# Patient Record
Sex: FEMALE | Race: WHITE | Employment: UNEMPLOYED | URBAN - METROPOLITAN AREA
[De-identification: names, ages, dates, MRNs, and addresses within clinical notes are randomized per-mention and may not be internally consistent; named-entity substitution may affect disease eponyms.]

---

## 2017-03-02 PROCEDURE — 75810000283 HC INJECTION NERVE BLOCK

## 2017-03-02 PROCEDURE — 99282 EMERGENCY DEPT VISIT SF MDM: CPT

## 2017-03-03 ENCOUNTER — HOSPITAL ENCOUNTER (EMERGENCY)
Age: 27
Discharge: HOME | End: 2017-03-03
Attending: EMERGENCY MEDICINE
Payer: COMMERCIAL

## 2017-03-03 VITALS
WEIGHT: 168.65 LBS | RESPIRATION RATE: 16 BRPM | SYSTOLIC BLOOD PRESSURE: 133 MMHG | TEMPERATURE: 98.5 F | BODY MASS INDEX: 29.88 KG/M2 | HEIGHT: 63 IN | HEART RATE: 73 BPM | OXYGEN SATURATION: 100 % | DIASTOLIC BLOOD PRESSURE: 78 MMHG

## 2017-03-03 DIAGNOSIS — K02.9 PAIN DUE TO DENTAL CARIES: Primary | ICD-10-CM

## 2017-03-03 DIAGNOSIS — Z72.0 TOBACCO ABUSE: ICD-10-CM

## 2017-03-03 RX ORDER — BUPIVACAINE HYDROCHLORIDE 5 MG/ML
5 INJECTION, SOLUTION EPIDURAL; INTRACAUDAL
Status: DISCONTINUED | OUTPATIENT
Start: 2017-03-03 | End: 2017-03-03 | Stop reason: HOSPADM

## 2017-03-03 RX ORDER — OXYCODONE AND ACETAMINOPHEN 5; 325 MG/1; MG/1
1 TABLET ORAL
Qty: 12 TAB | Refills: 0 | Status: SHIPPED | OUTPATIENT
Start: 2017-03-03 | End: 2017-06-06

## 2017-03-03 RX ORDER — CLINDAMYCIN HYDROCHLORIDE 150 MG/1
300 CAPSULE ORAL 3 TIMES DAILY
Qty: 42 CAP | Refills: 0 | Status: SHIPPED | OUTPATIENT
Start: 2017-03-03 | End: 2017-03-10

## 2017-03-03 RX ORDER — LIDOCAINE HYDROCHLORIDE AND EPINEPHRINE 10; 10 MG/ML; UG/ML
1.5 INJECTION, SOLUTION INFILTRATION; PERINEURAL ONCE
Status: DISCONTINUED | OUTPATIENT
Start: 2017-03-03 | End: 2017-03-03 | Stop reason: HOSPADM

## 2017-03-03 NOTE — ED NOTES
HOLLAND Ding at the bedside to preform nerve block. Discharge instructions reviewed with pt and given by HOLLAND Ding. Pt ambulated out of ED with steady gait accompanied by adult female visitor, after declining wheelchair ride. No other complaints voiced at the time of discharge.

## 2017-03-03 NOTE — ED PROVIDER NOTES
HPI Comments: Curt Arana is a 23 y.o. female presenting ambulatory to ED [facility] ED with c/o sudden onset of gradually worsening right upper dental pain. Per pt, she has been undergoing treatment with Penicillin and Percocet for a dental abscess. Pt notes the medications initially assisted with her pain but since she ran out, her discomfort has returned alongside a small abscess near the fractured right upper molar. Pt rates her current pain as a 10/10 on a pain scale with an associated aching, throbbing sensation to the region. Pt expresses concern to obtain alleviation of her discomfort. Pt specifically denies any nausea, vomiting, fevers, chills, headache, earache, neck pain, or facial swelling. PCP: None    PSHx: appendectomy, cholecystectomy   Social Hx: + EtOH; + Smoker; - Illicit Drugs    There are no other changes, complaints or physical findings at this time. The history is provided by the patient. Past Medical History:   Diagnosis Date    Abnormal Pap smear     chlamydia at beginning of preg; treated at that time    Chronic kidney disease     She thinks she has CKD or \"small kidneys\"     HX OTHER MEDICAL      x2    Pyelonephritis 2015    Rhesus isoimmunization unspecified as to episode of care in pregnancy     o negative     Past Surgical History:   Procedure Laterality Date    HX APPENDECTOMY      HX CHOLECYSTECTOMY       Family History:   Problem Relation Age of Onset    Hypertension Mother     Stroke Mother     Diabetes Father     Stroke Father      Social History     Social History    Marital status: LEGALLY      Spouse name: N/A    Number of children: N/A    Years of education: N/A     Occupational History    Not on file.      Social History Main Topics    Smoking status: Current Every Day Smoker     Packs/day: 0.25     Years: 2.00    Smokeless tobacco: Not on file      Comment: nicotine patch, education on smoking and dental problems    Alcohol use Yes Comment: occ    Drug use: No    Sexual activity: Yes     Partners: Male     Birth control/ protection: Inserts, Pill     Other Topics Concern    Not on file     Social History Narrative     ALLERGIES: Morphine; Nsaids (non-steroidal anti-inflammatory drug); and Tramadol    Review of Systems   Constitutional: Negative for fatigue and fever. HENT: Positive for dental problem. Negative for ear pain, facial swelling and sore throat. Eyes: Negative for pain, redness and visual disturbance. Respiratory: Negative for cough and shortness of breath. Cardiovascular: Negative for chest pain and palpitations. Gastrointestinal: Negative for abdominal pain, nausea and vomiting. Genitourinary: Negative for dysuria, frequency and urgency. Musculoskeletal: Negative for back pain, gait problem, neck pain and neck stiffness. Skin: Negative for rash and wound. Neurological: Negative for dizziness, weakness, light-headedness, numbness and headaches. Vitals:    03/03/17 0000   BP: 133/78   Pulse: 73   Resp: 16   Temp: 98.5 °F (36.9 °C)   SpO2: 100%   Weight: 76.5 kg (168 lb 10.4 oz)   Height: 5' 3\" (1.6 m)          Physical Exam   Constitutional: She is oriented to person, place, and time. She appears well-developed and well-nourished. Non-toxic appearance. No distress. HENT:   Head: Normocephalic and atraumatic. Right Ear: External ear normal.   Left Ear: External ear normal.   Nose: Nose normal.   Mouth/Throat: Uvula is midline. No trismus in the jaw. No facial swelling  No trismus  Decayed remnants of right upper molars  Small gingival abscess above decayed right [id number] molar   Eyes: Conjunctivae and EOM are normal. Pupils are equal, round, and reactive to light. No scleral icterus. Neck: Normal range of motion and full passive range of motion without pain. Cardiovascular: Normal rate and regular rhythm. Pulmonary/Chest: Effort normal. No accessory muscle usage. No tachypnea.  No respiratory distress. She has no decreased breath sounds. She has no wheezes. Abdominal: Soft. There is no tenderness. Musculoskeletal: Normal range of motion. Neurological: She is alert and oriented to person, place, and time. She is not disoriented. No cranial nerve deficit. GCS eye subscore is 4. GCS verbal subscore is 5. GCS motor subscore is 6. Skin: Skin is intact. No rash noted. Psychiatric: She has a normal mood and affect. Her speech is normal.   Nursing note and vitals reviewed. MDM  Number of Diagnoses or Management Options  Pain due to dental caries:   Tobacco abuse:   Diagnosis management comments: DDx: dentalgia, dental caries, dental abscess, dental fracture, gingivitis, tobacco abuse       Amount and/or Complexity of Data Reviewed  Review and summarize past medical records: yes    Patient Progress  Patient progress: stable    ED Course     Procedures    TOBACCO COUNSELING:  Upon evaluation, pt expressed that they are a current tobacco user. Pt has been counseled on the dangers of smoking and was encouraged to quit as soon as possible in order to decrease further risks to their health. Pt has conveyed their understanding of the risks involved should they continue to use tobacco products. Procedure Note - Dental Block:    1:28 AM  Performed by Giancarlo Matute. A posterior superior alveolar nerve block was performed on the right side. 2 mL of 50/50 mix of 1% Lidocaine with Epinephrine and 0.5% Marcaine was injected. Applied gentle pressure at the gumline near the abscess with scant purulent drainage. Total time at bedside, performing this procedure was 1-15 minutes. The procedure was tolerated well without any complications. Written by Daisy Moralez ED Scribe, as dictated by Giancarlo Matute.     MEDICATIONS GIVEN:  Medications   lidocaine-EPINEPHrine (XYLOCAINE) 1 %-1:100,000 injection 15 mg (not administered)   bupivacaine (PF) (MARCAINE) 0.5 % (5 mg/mL) injection 25 mg (not administered) IMPRESSION:  1. Pain due to dental caries    2. Tobacco abuse      PLAN:  1. Discharge Medication List as of 3/3/2017  1:16 AM      START taking these medications    Details   clindamycin (CLEOCIN) 150 mg capsule Take 2 Caps by mouth three (3) times daily for 7 days. , Print, Disp-42 Cap, R-0      oxyCODONE-acetaminophen (PERCOCET) 5-325 mg per tablet Take 1 Tab by mouth every four (4) hours as needed for Pain. Max Daily Amount: 6 Tabs., Print, Disp-12 Tab, R-0         CONTINUE these medications which have NOT CHANGED    Details   CRANBERRY FRUIT EXTRACT (CRANBERRY PO) Take  by mouth., Historical Med           2. Follow-up Information     Follow up With Details Comments Contact Info    [facility] Schedule an appointment as soon as possible for a visit DENTAL SERVICES: call to schedule follow up [address]  [phone]        Return to ED if worse     DISCHARGE NOTE:    1:32 AM  The patient is ready for discharge. The patient signs, symptoms, diagnosis, and discharge instructions have been discussed and the patient has conveyed their understanding. The patient is to follow-up as reccommended or returned to the ER should their symptoms worsen. Plan has been discussed and patient is in agreement. This note is prepared by Gurpreet Clifford acting as Scribe for Tomas Triplett Guess: This scribe's documentation has been prepared under my direction and personally reviewed by me in its entirety. I confirm that the note above accurately reflects all work, treatment procedures and medical decision makings performed by me.

## 2017-03-03 NOTE — LETTER
[address] 
[facility] EMERGENCY DEPT 
[address] [id number] 
[phone] Work/School Note Date: 3/2/2017 To Whom It May concern: 
 
Bárbara Beaver was seen and treated today in the emergency room by the following provider(s): 
Attending Provider: Nayeli Churchill DO Physician Assistant: MICHELA Velásquez. Bárbara Beaver may return to work on [id number]. Sincerely, MICHELA Velásquez

## 2017-03-03 NOTE — ED NOTES
Pt received in exam room, resting in bed comfortably with call bell in reach. Pt reports had broken her tooth about a week ago. Had taken all of her antibiotic and pain medicine, which helped at the time, but is now out. Pain has worsened to a 10/10 and has now developed a \"bubble,\" on the broken tooth. Pt had been waiting until financially ready to visit dentist to have tooth pulled.

## 2017-03-03 NOTE — DISCHARGE INSTRUCTIONS
Emergency [address] by DAVE [facility]  [address], [address]  Open M, W, F: 8AM - 5PM and T, Th: 8AM-6PM  Phone: [phone], press 4  $70 for Emergency Care  $60 for first routine care, then pay by sliding scale based upon income. [facility]  [address] [place], [address]  Phone: [phone]    [employer]  [address] [facility], [address]  Open Monday - Friday 8AM - 4:30 PM  Phone: [address] [place] [address] Dentistry Urgent [address] [place] [address] Dentistry, [address] [place] [address], [place] [address], [address] [place] [address] starting at 8:30 AM M-F  Phone: [phone], press 2  Fee: $150 per tooth (x-ray & extractions only)  Pediatrics Phone[de-identified] [phone], 8-5 M-F    [address] Dentistry, [address] [place] [address], 2nd Floor, [address] [place] [address] starting at 8:30 AM - 3 PM [address] [place] [address] St  [address] [facility], [address] [place] [address]  Phone: [phone] or [phone]  Emergency Hours: 9:30AM - 11AM (extractions)  Simple tooth extraction $ per tooth. #75 for x-ray    [facility] Residents only, over the age of 25  Phone: [phone]. Leave message saying you need an appointment to register.   Hours: Tuesday Evenings

## 2017-06-06 ENCOUNTER — HOSPITAL ENCOUNTER (EMERGENCY)
Age: 27
Discharge: HOME | End: 2017-06-06
Attending: EMERGENCY MEDICINE | Admitting: EMERGENCY MEDICINE
Payer: COMMERCIAL

## 2017-06-06 VITALS
TEMPERATURE: 98.8 F | HEIGHT: 64 IN | RESPIRATION RATE: 18 BRPM | SYSTOLIC BLOOD PRESSURE: 118 MMHG | BODY MASS INDEX: 27.63 KG/M2 | DIASTOLIC BLOOD PRESSURE: 73 MMHG | OXYGEN SATURATION: 100 % | HEART RATE: 98 BPM | WEIGHT: 161.82 LBS

## 2017-06-06 DIAGNOSIS — S02.5XXG OPEN FRACTURE OF TOOTH WITH DELAYED HEALING, SUBSEQUENT ENCOUNTER: ICD-10-CM

## 2017-06-06 DIAGNOSIS — R51.9 FACE PAIN: Primary | ICD-10-CM

## 2017-06-06 PROCEDURE — 74011250637 HC RX REV CODE- 250/637: Performed by: PHYSICIAN ASSISTANT

## 2017-06-06 PROCEDURE — 99283 EMERGENCY DEPT VISIT LOW MDM: CPT

## 2017-06-06 PROCEDURE — 74011000250 HC RX REV CODE- 250: Performed by: PHYSICIAN ASSISTANT

## 2017-06-06 RX ORDER — OXYCODONE AND ACETAMINOPHEN 5; 325 MG/1; MG/1
1 TABLET ORAL
Status: COMPLETED | OUTPATIENT
Start: 2017-06-06 | End: 2017-06-06

## 2017-06-06 RX ORDER — DIPHENHYDRAMINE HCL 12.5MG/5ML
25 ELIXIR ORAL
Status: COMPLETED | OUTPATIENT
Start: 2017-06-06 | End: 2017-06-06

## 2017-06-06 RX ORDER — OXYCODONE AND ACETAMINOPHEN 5; 325 MG/1; MG/1
.5-1 TABLET ORAL
Qty: 12 TAB | Refills: 0 | Status: SHIPPED | OUTPATIENT
Start: 2017-06-06 | End: 2017-08-11

## 2017-06-06 RX ORDER — AMOXICILLIN 500 MG/1
500 TABLET, FILM COATED ORAL 3 TIMES DAILY
Qty: 30 TAB | Refills: 0 | Status: SHIPPED | OUTPATIENT
Start: 2017-06-06 | End: 2017-08-11

## 2017-06-06 RX ORDER — AMOXICILLIN 250 MG/1
500 CAPSULE ORAL
Status: COMPLETED | OUTPATIENT
Start: 2017-06-06 | End: 2017-06-06

## 2017-06-06 RX ORDER — LIDOCAINE HYDROCHLORIDE 20 MG/ML
10 SOLUTION OROPHARYNGEAL
Status: COMPLETED | OUTPATIENT
Start: 2017-06-06 | End: 2017-06-06

## 2017-06-06 RX ADMIN — LIDOCAINE HYDROCHLORIDE 10 ML: 20 SOLUTION ORAL; TOPICAL at 23:38

## 2017-06-06 RX ADMIN — BENZOCAINE 1 SPRAY: 200 SPRAY DENTAL; ORAL; PERIODONTAL at 23:37

## 2017-06-06 RX ADMIN — DIPHENHYDRAMINE HYDROCHLORIDE 25 MG: 12.5 SOLUTION ORAL at 23:38

## 2017-06-06 RX ADMIN — AMOXICILLIN 500 MG: 250 CAPSULE ORAL at 23:49

## 2017-06-06 RX ADMIN — OXYCODONE HYDROCHLORIDE AND ACETAMINOPHEN 1 TABLET: 5; 325 TABLET ORAL at 23:39

## 2017-06-06 NOTE — LETTER
[address] 
[facility] EMERGENCY DEPT 
[address] [place] [address] [address] [id number] [phone] Work/School Note Date: 6/6/2017 To Whom It May concern: 
 
Damaris Pickett was seen and treated today in the emergency room by the following provider(s): 
Attending Provider: Narayan Mayorga MD 
Physician Assistant: MICHELA Quach. Damaris Pickett may return to work on 6/9/17 or sooner, if feeling better. Sincerely, Minnie Calzada PA

## 2017-06-07 NOTE — DISCHARGE INSTRUCTIONS
Emergency [address] by [address] [facility]  [address] [facility] [address] [facility], [address] [facility]  Open M, W, F: 8AM - 5PM and T, Th: 8AM-6PM  Phone: [phone], press 4  $70 for Emergency Care  $60 for first routine care, then pay by sliding scale based upon income. Formerly named [facility] [address] [facility], [address]  Phone: [phone]    [employer]  [address] [facility], [address]  Open Monday - Friday 8AM - 4:30 PM  Phone: [address] [place] [address] Dentistry Urgent [address] [place] [address] Dentistry, [address] [place] [address], [place] [address], [address] [place] [address] starting at 8:30 AM M-F  Phone: [phone], press 2  Fee: $150 per tooth (x-ray & extractions only)  Pediatrics Phone[de-identified] [phone], 8-5 M-F    [address] Dentistry, [address] [facility], 2nd Floor, [address] [place] [address] starting at 8:30 AM - 3 PM [address] [place] [address] St  225 [facility], [address] [facility]  Phone: [phone] or [phone]  Emergency Hours: 9:30AM - 11AM (extractions)  Simple tooth extraction $ per tooth. #75 for x-ray    [facility] Residents only, over the age of 25  Phone: [phone]. Leave message saying you need an appointment to register. Hours: Tuesday Evenings  Broken Tooth: Care Instructions  Your Care Instructions  A tooth can be chipped, broken, or knocked out during sports, an accident, or a bad fall. Your doctor may have fixed your tooth temporarily. You also may have been given pain medicine. If you had signs of infection, you may need to take antibiotics. You will need to see a dentist. If you have chipped a tooth, it may be jagged, which can irritate your mouth and tongue. The dentist may smooth the edges and fill in the part that chipped off.  A permanent tooth that has been knocked out can be put back in (reimplanted) if it is done quickly. The dentist may need to put a crown on a broken tooth to cover the tooth and hold it together. Prompt dental treatment can often prevent infection in the tooth. Follow-up care is a key part of your treatment and safety. Be sure to make and go to all appointments, and call your doctor if you are having problems. It's also a good idea to know your test results and keep a list of the medicines you take. How can you care for yourself at home? · If your tooth pulp is exposed, you can protect it by putting temporary filling material over the broken area. You can buy temporary filling mixes in drugstores. Follow the directions on the label. · To relieve pain and swelling, put ice or a cold cloth on the tooth's gum or cheek area, or suck on a piece of ice. But if the tooth's nerve or pulp is exposed, avoid putting anything too hot or cold near the tooth until you see your dentist.  · Ask your doctor if you can take an over-the-counter pain medicine, such as acetaminophen (Tylenol), ibuprofen (Advil, Motrin), or naproxen (Aleve). Be safe with medicines. Read and follow all instructions on the label. · If your doctor prescribed antibiotics, take them as directed. Do not stop taking them just because you feel better. You need to take the full course of antibiotics. · To help healing, rinse your mouth with warm salt water right after meals. To make a saltwater solution, mix 1 teaspoon of salt in 1 cup of warm water. · Eat soft foods that are easy to chew. · Avoid foods that might sting, such as salty or spicy foods, citrus fruits, and tomatoes. · Do not smoke or use spit tobacco. Tobacco can slow healing in your mouth. If you need help quitting, talk to your doctor about stop-smoking programs and medicines. These can increase your chances of quitting for good. · If your tooth is loose, be gentle when you brush or floss.  But be sure to brush your teeth at least two times a day, and floss at least once a day. When should you call for help? Call your doctor now or seek immediate medical care if:  · You have signs of infection, such as:  ¨ Increased pain or swelling in your mouth. ¨ Red streaks leading from the gum tissue around the tooth. ¨ Pus draining from the area around the tooth. ¨ A fever. · You have pain and swelling after chipping or breaking a tooth. · You have bleeding near a tooth. · You are not able to open or close your mouth normally. Watch closely for changes in your health, and be sure to contact your doctor if:  · Your tooth is sensitive to heat, cold, air, or sweets. · You do not get better as expected. Where can you learn more? Go to http://adam-nelson.info/. Enter W162 in the search box to learn more about \"Broken Tooth: Care Instructions. \"  Current as of: August 9, 2016  Content Version: 11.2  © 2548-2154 [employer]. Care instructions adapted under license by [employer] (which disclaims liability or warranty for this information). If you have questions about a medical condition or this instruction, always ask your healthcare professional. [address] any warranty or liability for your use of this information.

## 2017-06-07 NOTE — ED PROVIDER NOTES
HPI Comments:   Jovany Jeffries is a 33 y.o. female presenting to the ED with her  C/O right upper dental pain which started earlier today. Pt reports she believes one of her teeth may have broken earlier today. Associated symptoms include HA. She is currently waiting to receive her tax return to see a dentist because she cannot otherwise afford it. Patient denies fever or any other symptoms or complaints. There are no other complaints, changes or physical findings at this time. Written by MARKOS Ibrahim, as dictated by Joe Marino      The history is provided by the patient. No  was used. Past Medical History:   Diagnosis Date    Abnormal Pap smear     chlamydia at beginning of preg; treated at that time    Chronic kidney disease     She thinks she has CKD or \"small kidneys\"     HX OTHER MEDICAL      x2    Pyelonephritis 2015    Rhesus isoimmunization unspecified as to episode of care in pregnancy     o negative       Past Surgical History:   Procedure Laterality Date    HX APPENDECTOMY      HX CHOLECYSTECTOMY           Family History:   Problem Relation Age of Onset    Hypertension Mother     Stroke Mother     Diabetes Father     Stroke Father        Social History     Social History    Marital status: LEGALLY      Spouse name: N/A    Number of children: N/A    Years of education: N/A     Occupational History    Not on file.      Social History Main Topics    Smoking status: Current Every Day Smoker     Packs/day: 0.25     Years: 2.00    Smokeless tobacco: Not on file      Comment: nicotine patch, education on smoking and dental problems    Alcohol use Yes      Comment: occ    Drug use: No    Sexual activity: Yes     Partners: Male     Birth control/ protection: Inserts, Pill     Other Topics Concern    Not on file     Social History Narrative         ALLERGIES: Morphine; Nsaids (non-steroidal anti-inflammatory drug); and Tramadol    Review of Systems   Constitutional: Negative. Negative for fever. HENT: Positive for dental problem (right upper dental pain). Eyes: Negative. Respiratory: Negative. Cardiovascular: Negative. Gastrointestinal: Negative. Negative for constipation, diarrhea, nausea and vomiting. Denies liver disease   Endocrine:        Denies thyroid disease   Genitourinary: Negative. Negative for dysuria. Denies kidney disease   Musculoskeletal: Negative. Skin: Negative. Neurological: Positive for headaches. All other systems reviewed and are negative. Vitals:    06/06/17 2152   BP: 118/73   Pulse: 98   Resp: 18   Temp: 98.8 °F (37.1 °C)   SpO2: 100%   Weight: 73.4 kg (161 lb 13.1 oz)   Height: 5' 4\" (1.626 m)            Physical Exam   Constitutional: She is oriented to person, place, and time. She appears well-developed and well-nourished. No distress. HENT:   Head: Normocephalic and atraumatic. Right Ear: External ear normal.   Left Ear: External ear normal.   Nose: Nose normal.   Mouth/Throat: Oropharynx is clear and moist. No oropharyngeal exudate. Tender to right cheek, tooth 4&5 are fractured, no facial swelling   Eyes: Conjunctivae and EOM are normal. Pupils are equal, round, and reactive to light. Right eye exhibits no discharge. Left eye exhibits no discharge. No scleral icterus. Neck: Normal range of motion. Neck supple. No tracheal deviation present. Cardiovascular: Normal rate, regular rhythm, normal heart sounds and intact distal pulses. Exam reveals no gallop and no friction rub. No murmur heard. Pulmonary/Chest: Effort normal and breath sounds normal. No respiratory distress. She has no wheezes. She has no rales. She exhibits no tenderness. Musculoskeletal: She exhibits no edema or tenderness. Lymphadenopathy:     She has no cervical adenopathy. Neurological: She is alert and oriented to person, place, and time. No cranial nerve deficit. Skin: Skin is warm and dry. No rash noted. No erythema. Psychiatric: She has a normal mood and affect. Her behavior is normal.   Nursing note and vitals reviewed. MDM  Number of Diagnoses or Management Options  Diagnosis management comments: DDx: caries, face pain, tooth fx, abscess       Amount and/or Complexity of Data Reviewed  Review and summarize past medical records: yes    Patient Progress  Patient progress: stable      Procedures    MEDICATIONS GIVEN:  Medications   oxyCODONE-acetaminophen (PERCOCET) 5-325 mg per tablet 1 Tab (not administered)   lidocaine (XYLOCAINE) 2 % viscous solution 10 mL (not administered)   diphenhydrAMINE (BENADRYL) 12.5 mg/5 mL oral elixir 25 mg (not administered)   benzocaine (HURRICANE) 20 % spray 1 Spray (not administered)   amoxicillin (AMOXIL) capsule 500 mg (not administered)       IMPRESSION:  1. Face pain    2. Open fracture of tooth with delayed healing, subsequent encounter        PLAN:  1. Current Discharge Medication List      START taking these medications    Details   oxyCODONE-acetaminophen (PERCOCET) 5-325 mg per tablet Take 0.5-1 Tabs by mouth every six (6) hours as needed for Pain (Patient must fill antibiotic in order to fill this.). Max Daily Amount: 4 Tabs. Qty: 12 Tab, Refills: 0      amoxicillin 500 mg tab Take 500 mg by mouth three (3) times daily. Qty: 30 Tab, Refills: 0           2. Follow-up Information     Follow up With Details Comments Contact Info    Local dentist       [facility] EMERGENCY DEPT  If symptoms worsen [address] [place] [address] Drive  [address] [facility]  [phone]        Return to ED if worse     Discharge Note:  11:33 PM  The patient is ready for discharge. The patient's signs, symptoms, diagnosis, and discharge instruction have been discussed and the patient has conveyed their understanding. The patient is to follow up as recommended or return to the ER should their symptoms worsen.  Plan has been discussed and the patient is in agreement. Written by MARKOS Weaver, as dictated by Ramiro Ortega. Attestation: This note is prepared by Jesse Pérez, acting as Scribe for Ramiro Ortega. PA-C Terrance Sandhoff: The scribe's documentation has been prepared under my direction and personally reviewed by me in its entirety. I confirm that the note above accurately reflects all work, treatment, procedures, and medical decision making performed by me.

## 2017-06-07 NOTE — ED NOTES
.Discharge instructions reviewed with patient and given to pt per MICHELA Brannon. Pt able to return/verbalize discharge instructions. Copy of discharge instructions given. RX given to pt. Pt condition stable, no further complaints. Pt out of ER, accompanied by self & family. Ambulatory, steady gait. Wheelchair offered & pt declined. Patient out of ED prior to obtaining discharge vitals. Family to drive patient home. Belongings & discharge paperwork out of ED with patient.

## 2017-06-20 LAB
HBSAG, EXTERNAL: NEGATIVE
HIV, EXTERNAL: NON REACTIVE
RUBELLA, EXTERNAL: NORMAL
TYPE, ABO & RH, EXTERNAL: NORMAL

## 2017-09-20 ENCOUNTER — APPOINTMENT (OUTPATIENT)
Dept: ULTRASOUND IMAGING | Age: 27
End: 2017-09-20
Attending: EMERGENCY MEDICINE
Payer: COMMERCIAL

## 2017-09-20 ENCOUNTER — HOSPITAL ENCOUNTER (EMERGENCY)
Age: 27
Discharge: HOME | End: 2017-09-20
Attending: EMERGENCY MEDICINE
Payer: COMMERCIAL

## 2017-09-20 VITALS
TEMPERATURE: 99.4 F | OXYGEN SATURATION: 99 % | BODY MASS INDEX: 28.98 KG/M2 | SYSTOLIC BLOOD PRESSURE: 109 MMHG | RESPIRATION RATE: 16 BRPM | DIASTOLIC BLOOD PRESSURE: 52 MMHG | WEIGHT: 169.75 LBS | HEIGHT: 64 IN

## 2017-09-20 DIAGNOSIS — O20.0 THREATENED MISCARRIAGE: Primary | ICD-10-CM

## 2017-09-20 DIAGNOSIS — Z67.91 RH NEGATIVE STATUS DURING PREGNANCY, SECOND TRIMESTER: ICD-10-CM

## 2017-09-20 DIAGNOSIS — N30.00 ACUTE CYSTITIS WITHOUT HEMATURIA: ICD-10-CM

## 2017-09-20 DIAGNOSIS — O26.892 RH NEGATIVE STATUS DURING PREGNANCY, SECOND TRIMESTER: ICD-10-CM

## 2017-09-20 LAB
ALBUMIN SERPL-MCNC: 3 G/DL (ref 3.5–5)
ALBUMIN/GLOB SERPL: 0.8 {RATIO} (ref 1.1–2.2)
ALP SERPL-CCNC: 67 U/L (ref 45–117)
ALT SERPL-CCNC: 23 U/L (ref 12–78)
ANION GAP SERPL CALC-SCNC: 7 MMOL/L (ref 5–15)
APPEARANCE UR: CLEAR
AST SERPL-CCNC: 16 U/L (ref 15–37)
BACTERIA URNS QL MICRO: ABNORMAL /HPF
BASOPHILS # BLD: 0 K/UL (ref 0–0.1)
BASOPHILS NFR BLD: 0 % (ref 0–1)
BILIRUB SERPL-MCNC: 0.5 MG/DL (ref 0.2–1)
BILIRUB UR QL: NEGATIVE
BUN SERPL-MCNC: 9 MG/DL (ref 6–20)
BUN/CREAT SERPL: 20 (ref 12–20)
CALCIUM SERPL-MCNC: 8.3 MG/DL (ref 8.5–10.1)
CHLORIDE SERPL-SCNC: 107 MMOL/L (ref 97–108)
CLUE CELLS VAG QL WET PREP: NORMAL
CO2 SERPL-SCNC: 24 MMOL/L (ref 21–32)
COLOR UR: ABNORMAL
CREAT SERPL-MCNC: 0.44 MG/DL (ref 0.55–1.02)
EOSINOPHIL # BLD: 0.2 K/UL (ref 0–0.4)
EOSINOPHIL NFR BLD: 3 % (ref 0–7)
EPITH CASTS URNS QL MICRO: ABNORMAL /LPF
ERYTHROCYTE [DISTWIDTH] IN BLOOD BY AUTOMATED COUNT: 14.1 % (ref 11.5–14.5)
GLOBULIN SER CALC-MCNC: 3.7 G/DL (ref 2–4)
GLUCOSE SERPL-MCNC: 103 MG/DL (ref 65–100)
GLUCOSE UR STRIP.AUTO-MCNC: NEGATIVE MG/DL
HCG UR QL: POSITIVE
HCT VFR BLD AUTO: 32.1 % (ref 35–47)
HGB BLD-MCNC: 11.1 G/DL (ref 11.5–16)
HGB UR QL STRIP: NEGATIVE
HYALINE CASTS URNS QL MICRO: ABNORMAL /LPF (ref 0–5)
KETONES UR QL STRIP.AUTO: NEGATIVE MG/DL
KOH PREP SPEC: NORMAL
LEUKOCYTE ESTERASE UR QL STRIP.AUTO: NEGATIVE
LYMPHOCYTES # BLD: 2.3 K/UL (ref 0.8–3.5)
LYMPHOCYTES NFR BLD: 27 % (ref 12–49)
MCH RBC QN AUTO: 30.2 PG (ref 26–34)
MCHC RBC AUTO-ENTMCNC: 34.6 G/DL (ref 30–36.5)
MCV RBC AUTO: 87.2 FL (ref 80–99)
MONOCYTES # BLD: 0.5 K/UL (ref 0–1)
MONOCYTES NFR BLD: 6 % (ref 5–13)
NEUTS SEG # BLD: 5.4 K/UL (ref 1.8–8)
NEUTS SEG NFR BLD: 64 % (ref 32–75)
NITRITE UR QL STRIP.AUTO: NEGATIVE
PH UR STRIP: 6 [PH] (ref 5–8)
PLATELET # BLD AUTO: 200 K/UL (ref 150–400)
POTASSIUM SERPL-SCNC: 3.6 MMOL/L (ref 3.5–5.1)
PROT SERPL-MCNC: 6.7 G/DL (ref 6.4–8.2)
PROT UR STRIP-MCNC: NEGATIVE MG/DL
RBC # BLD AUTO: 3.68 M/UL (ref 3.8–5.2)
RBC #/AREA URNS HPF: ABNORMAL /HPF (ref 0–5)
SERVICE CMNT-IMP: NORMAL
SODIUM SERPL-SCNC: 138 MMOL/L (ref 136–145)
SP GR UR REFRACTOMETRY: 1.02 (ref 1–1.03)
T VAGINALIS VAG QL WET PREP: NORMAL
UA: UC IF INDICATED,UAUC: ABNORMAL
UROBILINOGEN UR QL STRIP.AUTO: 1 EU/DL (ref 0.2–1)
WBC # BLD AUTO: 8.4 K/UL (ref 3.6–11)
WBC URNS QL MICRO: ABNORMAL /HPF (ref 0–4)

## 2017-09-20 PROCEDURE — 87210 SMEAR WET MOUNT SALINE/INK: CPT | Performed by: EMERGENCY MEDICINE

## 2017-09-20 PROCEDURE — 96372 THER/PROPH/DIAG INJ SC/IM: CPT

## 2017-09-20 PROCEDURE — 85025 COMPLETE CBC W/AUTO DIFF WBC: CPT | Performed by: EMERGENCY MEDICINE

## 2017-09-20 PROCEDURE — 80053 COMPREHEN METABOLIC PANEL: CPT | Performed by: EMERGENCY MEDICINE

## 2017-09-20 PROCEDURE — 87086 URINE CULTURE/COLONY COUNT: CPT | Performed by: EMERGENCY MEDICINE

## 2017-09-20 PROCEDURE — 74011250637 HC RX REV CODE- 250/637: Performed by: EMERGENCY MEDICINE

## 2017-09-20 PROCEDURE — 76815 OB US LIMITED FETUS(S): CPT

## 2017-09-20 PROCEDURE — 81025 URINE PREGNANCY TEST: CPT | Performed by: EMERGENCY MEDICINE

## 2017-09-20 PROCEDURE — 36415 COLL VENOUS BLD VENIPUNCTURE: CPT | Performed by: EMERGENCY MEDICINE

## 2017-09-20 PROCEDURE — 99284 EMERGENCY DEPT VISIT MOD MDM: CPT

## 2017-09-20 PROCEDURE — 87491 CHLMYD TRACH DNA AMP PROBE: CPT | Performed by: EMERGENCY MEDICINE

## 2017-09-20 PROCEDURE — 81001 URINALYSIS AUTO W/SCOPE: CPT | Performed by: EMERGENCY MEDICINE

## 2017-09-20 PROCEDURE — 74011250636 HC RX REV CODE- 250/636: Performed by: EMERGENCY MEDICINE

## 2017-09-20 RX ORDER — OXYCODONE AND ACETAMINOPHEN 5; 325 MG/1; MG/1
2 TABLET ORAL
Status: COMPLETED | OUTPATIENT
Start: 2017-09-20 | End: 2017-09-20

## 2017-09-20 RX ORDER — OXYCODONE AND ACETAMINOPHEN 5; 325 MG/1; MG/1
1 TABLET ORAL
Qty: 20 TAB | Refills: 0 | Status: SHIPPED | OUTPATIENT
Start: 2017-09-20 | End: 2017-10-05

## 2017-09-20 RX ORDER — ONDANSETRON 4 MG/1
4 TABLET, ORALLY DISINTEGRATING ORAL
Qty: 20 TAB | Refills: 0 | Status: SHIPPED | OUTPATIENT
Start: 2017-09-20

## 2017-09-20 RX ORDER — CEPHALEXIN 500 MG/1
500 CAPSULE ORAL 3 TIMES DAILY
Qty: 21 CAP | Refills: 0 | Status: SHIPPED | OUTPATIENT
Start: 2017-09-20 | End: 2017-09-27

## 2017-09-20 RX ADMIN — HUMAN RHO(D) IMMUNE GLOBULIN 0.3 MG: 300 INJECTION, SOLUTION INTRAMUSCULAR at 20:21

## 2017-09-20 RX ADMIN — OXYCODONE HYDROCHLORIDE AND ACETAMINOPHEN 2 TABLET: 5; 325 TABLET ORAL at 20:35

## 2017-09-20 NOTE — ED PROVIDER NOTES
HPI Comments: Monica Patel is 16 week pregnant (/A2)  33 y.o. female with PMHx significant for chronic kidney disease, pyelonephritis, and chlamydia presenting ambulatory to ED [facility] ED with c/o 8/10 aching right flank pain x 2 days. The pt reports additional sx of abdominal cramping and vaginal spotting. She denies nausea and vomiting. OB/GYN: Dr. Marcelo Pearson  PSHx: cholecystectomy, appendectomy   Social History:  (+) Tobacco (0.25 ppd),   (+) EtOH,      (-) Drugs     There are no other complaints, changes, or physical findings at this time. The history is provided by the patient. Past Medical History:   Diagnosis Date    Abnormal Pap smear     chlamydia at beginning of preg; treated at that time    Chronic kidney disease     She thinks she has CKD or \"small kidneys\"     HX OTHER MEDICAL      x2    Pyelonephritis 2015    Rhesus isoimmunization unspecified as to episode of care in pregnancy     o negative       Past Surgical History:   Procedure Laterality Date    HX APPENDECTOMY      HX CHOLECYSTECTOMY           Family History:   Problem Relation Age of Onset    Hypertension Mother     Stroke Mother     Diabetes Father     Stroke Father        Social History     Social History    Marital status: LEGALLY      Spouse name: N/A    Number of children: N/A    Years of education: N/A     Occupational History    Not on file.      Social History Main Topics    Smoking status: Current Every Day Smoker     Packs/day: 0.25     Years: 2.00    Smokeless tobacco: Not on file      Comment: nicotine patch, education on smoking and dental problems    Alcohol use Yes      Comment: occ    Drug use: No    Sexual activity: Yes     Partners: Male     Birth control/ protection: Inserts, Pill     Other Topics Concern    Not on file     Social History Narrative         ALLERGIES: Morphine; Nsaids (non-steroidal anti-inflammatory drug); and Tramadol    Review of Systems   Constitutional: Negative for activity change, chills and fever. HENT: Negative for congestion and sore throat. Eyes: Negative for pain and redness. Respiratory: Negative for cough, chest tightness and shortness of breath. Cardiovascular: Negative for chest pain and palpitations. Gastrointestinal: Positive for abdominal pain. Negative for diarrhea, nausea and vomiting. Genitourinary: Positive for flank pain and vaginal bleeding. Negative for dysuria, frequency and urgency. Musculoskeletal: Negative for back pain and neck pain. Skin: Negative for rash. Neurological: Negative for syncope, light-headedness and headaches. Psychiatric/Behavioral: Negative for confusion. All other systems reviewed and are negative. Vitals:    09/20/17 2037 09/20/17 2045 09/20/17 2100 09/20/17 2115   BP:  116/56 123/66 109/52   Resp:       Temp:       SpO2: 99% 98% 99% 99%   Weight:       Height:                Physical Exam   Constitutional: She is oriented to person, place, and time. She appears well-developed and well-nourished. No distress. HENT:   Head: Normocephalic. Nose: Nose normal.   Mouth/Throat: Oropharynx is clear and moist. No oropharyngeal exudate. Eyes: Conjunctivae are normal. Pupils are equal, round, and reactive to light. No scleral icterus. Neck: Normal range of motion. Neck supple. No JVD present. No tracheal deviation present. No thyromegaly present. Cardiovascular: Normal rate, regular rhythm and intact distal pulses. Exam reveals no gallop and no friction rub. No murmur heard. Pulmonary/Chest: Effort normal and breath sounds normal. No stridor. No respiratory distress. She has no wheezes. She has no rales. Abdominal: Soft. Bowel sounds are normal. She exhibits no distension. There is no tenderness. There is no rebound and no guarding. Musculoskeletal: Normal range of motion. She exhibits no edema. Lymphadenopathy:     She has no cervical adenopathy.    Neurological: She is alert and oriented to person, place, and time. No cranial nerve deficit. She exhibits normal muscle tone. Coordination normal.   Skin: Skin is warm and dry. No rash noted. She is not diaphoretic. No erythema. Psychiatric: She has a normal mood and affect. Her behavior is normal.   Nursing note and vitals reviewed. MDM  Number of Diagnoses or Management Options  Acute cystitis without hematuria:   Rh negative status during pregnancy, second trimester:   Threatened miscarriage:   Diagnosis management comments:   DDx: UTI, threatened miscarriage       Amount and/or Complexity of Data Reviewed  Clinical lab tests: ordered and reviewed  Tests in the radiology section of CPT®: ordered and reviewed  Review and summarize past medical records: yes    Risk of Complications, Morbidity, and/or Mortality  General comments: Pt well appearing; afebrile; vs wnl; ultrasound with 17wk pregnancy with good fetal heart rate; incidental mild right hydro; pt had issues with hydro with prior pregnancy; no increased wbc; ua only with 1+ bacteria; decided to treat due to prior hx; ucx sent; os closed on exam; no cmt; pt O negative, and provided rhogam here; Carolyne Delvalle MD      Patient Progress  Patient progress: stable    ED Course       Procedures    9:45 PM  Bridgette Frederick's final results have been reviewed with her. She has been counseled regarding her diagnosis. She verbally conveys understanding and agreement of the signs, symptoms, diagnosis, treatment and prognosis .      LABORATORY TESTS:  Recent Results (from the past 12 hour(s))   URINALYSIS W/ REFLEX CULTURE    Collection Time: 09/20/17  5:05 PM   Result Value Ref Range    Color YELLOW/STRAW      Appearance CLEAR CLEAR      Specific gravity 1.022 1.003 - 1.030      pH (UA) 6.0 5.0 - 8.0      Protein NEGATIVE  NEG mg/dL    Glucose NEGATIVE  NEG mg/dL    Ketone NEGATIVE  NEG mg/dL    Bilirubin NEGATIVE  NEG      Blood NEGATIVE  NEG      Urobilinogen 1.0 0.2 - 1.0 EU/dL Nitrites NEGATIVE  NEG      Leukocyte Esterase NEGATIVE  NEG      WBC 0-4 0 - 4 /hpf    RBC 0-5 0 - 5 /hpf    Epithelial cells MODERATE (A) FEW /lpf    Bacteria 1+ (A) NEG /hpf    UA:UC IF INDICATED URINE CULTURE ORDERED (A) CNI      Hyaline cast 2-5 0 - 5 /lpf   HCG URINE, QL    Collection Time: 09/20/17  5:05 PM   Result Value Ref Range    HCG urine, Ql. POSITIVE (A) NEG     CBC WITH AUTOMATED DIFF    Collection Time: 09/20/17  5:54 PM   Result Value Ref Range    WBC 8.4 3.6 - 11.0 K/uL    RBC 3.68 (L) 3.80 - 5.20 M/uL    HGB 11.1 (L) 11.5 - 16.0 g/dL    HCT 32.1 (L) 35.0 - 47.0 %    MCV 87.2 80.0 - 99.0 FL    MCH 30.2 26.0 - 34.0 PG    MCHC 34.6 30.0 - 36.5 g/dL    RDW 14.1 11.5 - 14.5 %    PLATELET 782 969 - 755 K/uL    NEUTROPHILS 64 32 - 75 %    LYMPHOCYTES 27 12 - 49 %    MONOCYTES 6 5 - 13 %    EOSINOPHILS 3 0 - 7 %    BASOPHILS 0 0 - 1 %    ABS. NEUTROPHILS 5.4 1.8 - 8.0 K/UL    ABS. LYMPHOCYTES 2.3 0.8 - 3.5 K/UL    ABS. MONOCYTES 0.5 0.0 - 1.0 K/UL    ABS. EOSINOPHILS 0.2 0.0 - 0.4 K/UL    ABS. BASOPHILS 0.0 0.0 - 0.1 K/UL   METABOLIC PANEL, COMPREHENSIVE    Collection Time: 09/20/17  5:54 PM   Result Value Ref Range    Sodium 138 136 - 145 mmol/L    Potassium 3.6 3.5 - 5.1 mmol/L    Chloride 107 97 - 108 mmol/L    CO2 24 21 - 32 mmol/L    Anion gap 7 5 - 15 mmol/L    Glucose 103 (H) 65 - 100 mg/dL    BUN 9 6 - 20 MG/DL    Creatinine 0.44 (L) 0.55 - 1.02 MG/DL    BUN/Creatinine ratio 20 12 - 20      GFR est AA >60 >60 ml/min/1.73m2    GFR est non-AA >60 >60 ml/min/1.73m2    Calcium 8.3 (L) 8.5 - 10.1 MG/DL    Bilirubin, total 0.5 0.2 - 1.0 MG/DL    ALT (SGPT) 23 12 - 78 U/L    AST (SGOT) 16 15 - 37 U/L    Alk.  phosphatase 67 45 - 117 U/L    Protein, total 6.7 6.4 - 8.2 g/dL    Albumin 3.0 (L) 3.5 - 5.0 g/dL    Globulin 3.7 2.0 - 4.0 g/dL    A-G Ratio 0.8 (L) 1.1 - 2.2     RH IMMUNE GLOBULIN PROPHYLACTIC    Collection Time: 09/20/17  6:45 PM   Result Value Ref Range    No. of weeks gestation 16 WEEKS PER JYOTSNA IN      Unit number [id number]     Blood component type RH IMMUNE GLOBULIN     Unit division 00     Status of unit ISSUED    KOH, OTHER SOURCES    Collection Time: 09/20/17  9:11 PM   Result Value Ref Range    Special Requests: NO SPECIAL REQUESTS      KOH NO YEAST SEEN     WET PREP    Collection Time: 09/20/17  9:11 PM   Result Value Ref Range    Clue cells CLUE CELLS ABSENT      Wet prep NO TRICHOMONAS SEEN           IMAGING RESULTS:   PREG UTS LTD   Final Result      EXAM:   PREG UTS LTD   INDICATION: Abdominal pain and vaginal bleeding, 17 weeks pregnant. LMP  5/14/2017. COMPARISON: None.     TECHNIQUE:   Limited real time obstetric ultrasound was performed with multiple static images  obtained.     FINDINGS:  There is a single intrauterine gestation. The estimated gestational age is 17  weeks 4 days +/- 1 week 2 days based on measurement of biparietal diameter, head  circumference, abdominal circumference and femur length. Fetal cardiac activity  is identified with a  fetal heart rate of 129 beats per minute. Fetal anatomic  survey was not performed. The placenta is anterior. The cervix is closed. There  is no placenta previa. Amniotic fluid volume is subjectively normal.  The  ovaries are normal in size and echotexture. The ovaries are not visualized due  to the gravid uterus. Incidental note is made of mild right hydronephrosis.     IMPRESSION  IMPRESSION: Single live 17 week 4 day intrauterine gestation. MEDICATIONS GIVEN:  Medications   Rho D immune globulin (RHOGAM) 1,500 unit (300 mcg) injection 0.3 mg (0.3 mg IntraMUSCular Given 9/20/17 2021)   oxyCODONE-acetaminophen (PERCOCET) 5-325 mg per tablet 2 Tab (2 Tabs Oral Given 9/20/17 2035)       IMPRESSION:  1. Threatened miscarriage    2. Acute cystitis without hematuria    3. Rh negative status during pregnancy, second trimester        PLAN:  1.    Current Discharge Medication List      START taking these medications    Details oxyCODONE-acetaminophen (PERCOCET) 5-325 mg per tablet Take 1 Tab by mouth every four (4) hours as needed for Pain. Max Daily Amount: 6 Tabs. Qty: 20 Tab, Refills: 0      ondansetron (ZOFRAN ODT) 4 mg disintegrating tablet Take 1 Tab by mouth every eight (8) hours as needed for Nausea. Qty: 20 Tab, Refills: 0      cephALEXin (KEFLEX) 500 mg capsule Take 1 Cap by mouth three (3) times daily for 7 days. Qty: 21 Cap, Refills: 0           2. Follow-up Information     Follow up With Details Comments [address] MD Michele Schedule an appointment as soon as possible for a visit in 1 day  [phone]      Carin Mora MD Schedule an appointment as soon as possible for a visit in 1 day  [address]  [facility]  [phone]          Return to ED if worse     DISCHARGE NOTE  9:45 PM  The patient has been re-evaluated and is ready for discharge. Reviewed available results with patient. Counseled pt on diagnosis and care plan. Pt has expressed understanding, and all questions have been answered. Pt agrees with plan and agrees to F/U as recommended, or return to the ED if their sxs worsen. Discharge instructions have been provided and explained to the pt, along with reasons to return to the ED. This note is prepared by Jamil Argueta, acting as Scribe for Ragini Lewis MD.    Ragini Lewis MD: The scribe's documentation has been prepared under my direction and personally reviewed by me in its entirety. I confirm that the note above accurately reflects all work, treatment, procedures, and medical decision making performed by me.

## 2017-09-20 NOTE — LETTER
[address] 
[facility] EMERGENCY DEPT 
[address] [place] [address] [address] [id number] [phone] Work/School Note Date: 9/20/2017 To Whom It May concern: 
 
Cullen Marr was seen and treated today in the emergency room by the following provider(s): 
Attending Provider: Eather Cranker, MD.   
 
Cullen Marr may return to work on 9/22/17. Sincerely, Lukasz Crisostomo RN

## 2017-09-20 NOTE — ED NOTES
Verbal shift change report given to [facility] (oncoming nurse) by Janet DAMONRN(offgoing nurse). Report included the following information SBAR, Kardex and ED Summary. Patient resting comfortably, side rails up, call bell w/in reach, no further needs expressed at this time, aware of POC. Family at the bedside.

## 2017-09-20 NOTE — ED NOTES
Received pt to exam room for c/o flank pain x 2 days along w/ painful urination. Pt reports she is 17 weeks pregnant.

## 2017-09-21 LAB
BLD PROD TYP BPU: NORMAL
BPU ID: NORMAL
GA (WEEKS): NORMAL WK
STATUS OF UNIT,%ST: NORMAL
UNIT DIVISION, %UDIV: 0

## 2017-09-21 NOTE — ED NOTES
Patient discharged and given discharge instructions by [facility] MD PINKY. Patient had an opportunity to ask questions. Patient verbalized understanding of discharge instructions. Patient ambulatory from ED, discharge instructions and prescriptions in hand. Patient accompanied by male .

## 2017-09-21 NOTE — DISCHARGE INSTRUCTIONS
Urinary Tract Infection in Pregnancy: Care Instructions  Your Care Instructions    A urinary tract infection, or UTI, is an infection of the bladder and other urinary structures. Most UTIs occur in the bladder. They often cause pain or burning when you urinate. UTI is the most common bacterial infection in pregnancy. If untreated, a UTI could lead to problems such as a kidney infection or  labor. Most UTIs can be cured with antibiotics. Your doctor will prescribe an antibiotic that is safe during pregnancy. Be sure to finish your medicine so that the infection does not spread to your kidneys. Follow-up care is a key part of your treatment and safety. Be sure to make and go to all appointments, and call your doctor if you are having problems. It's also a good idea to know your test results and keep a list of the medicines you take. How can you care for yourself at home? · Take your antibiotics as directed. Do not stop taking them just because you feel better. You need to take the full course of antibiotics. · Drink extra water and other fluids for the next day or two. This will help wash out the bacteria causing the infection. If you have kidney, heart, or liver disease and have to limit fluids, talk with your doctor before you increase the amount of fluids you drink. · Do not drink alcohol. · Urinate often. Try to empty your bladder each time. Preventing UTIs  · Drink plenty of fluids, enough so that your urine is light yellow or clear like water. This helps you urinate often, which clears bacteria from your system. If you have kidney, heart, or liver disease and have to limit fluids, talk with your doctor before you increase the amount of fluids you drink. · Urinate when you first have the urge. · Urinate right after you have sex. This is the best way for women to avoid UTIs. · When going to the bathroom, wipe from front to back to keep bacteria from entering the vagina or urethra.   When should you call for help? Call your doctor now or seek immediate medical care if:  · Symptoms such as fever, chills, nausea, or vomiting get worse or appear for the first time. · You have new pain in your back just below your rib cage. This is called flank pain. · There is new blood or pus in your urine. · You have any problems with your antibiotic medicine. Watch closely for changes in your health, and be sure to contact your doctor if:  · You are not getting better after 1 day (24 hours). · You have new symptoms, such as blood in your urine. Where can you learn more? Go to http://adam-nelson.info/. Enter M982 in the search box to learn more about \"Urinary Tract Infection in Pregnancy: Care Instructions. \"  Current as of: March 16, 2017  Content Version: 11.3  © 1556-9676 [employer]. Care instructions adapted under license by [employer] (which disclaims liability or warranty for this information). If you have questions about a medical condition or this instruction, always ask your healthcare professional. [place] [address] any warranty or liability for your use of this information. Threatened Miscarriage: Care Instructions  Your Care Instructions    Some women have light spotting or bleeding during the first 12 weeks of pregnancy. In some cases this is normal. Light spotting or bleeding can also be a sign of a possible loss of the pregnancy. This is called a threatened miscarriage. At this point, the doctor may not be able to tell if your vaginal bleeding is normal or is a sign of a miscarriage. In early pregnancy, things such as stress, exercise, and sex do not cause miscarriage. You may be worried or upset about the possibility of losing your pregnancy. But do not blame yourself. There is no treatment to stop a threatened miscarriage. If you do have a miscarriage, there was nothing you could have done to prevent it.  A miscarriage usually means that the pregnancy is not developing normally. The doctor has checked you carefully, but problems can develop later. If you notice any problems or new symptoms, get medical treatment right away. Follow-up care is a key part of your treatment and safety. Be sure to make and go to all appointments, and call your doctor if you are having problems. It's also a good idea to know your test results and keep a list of the medicines you take. How can you care for yourself at home? · If you do have a miscarriage, you will probably have some vaginal bleeding for 1 to 2 weeks. Use pads instead of tampons. · Take acetaminophen (Tylenol) for cramps. Read and follow all instructions on the label. · Do not take two or more pain medicines at the same time unless the doctor told you to. Many pain medicines have acetaminophen, which is Tylenol. Too much acetaminophen (Tylenol) can be harmful. · Do not have sex until your doctor says it is okay. · Get lots of rest over the next several days. · You may do your normal activities if you feel well enough to do them. But do not do any heavy exercise until your doctor says it is okay. · Eat a balanced diet that is high in iron and vitamin C. Foods rich in iron include red meat, shellfish, eggs, beans, and leafy green vegetables. Foods high in vitamin C include citrus fruits, tomatoes, and broccoli. Talk to your doctor about whether you need to take iron pills or a multivitamin. · Do not drink alcohol or use tobacco or illegal drugs. · Do not smoke. If you need help quitting, talk to your doctor about stop-smoking programs and medicines. These can increase your chances of quitting for good. When should you call for help? Call 911 anytime you think you may need emergency care. For example, call if:  · You have sudden, severe pain in your belly or pelvis. · You passed out (lost consciousness). · You have severe vaginal bleeding.   Call your doctor now or seek immediate medical care if:  · You are dizzy or lightheaded, or you feel like you may faint. · You have new or increased pain in your belly or pelvis. · Your vaginal bleeding is getting worse. · You have increased pain in the vaginal area. · You have a fever. · You think you may have passed tissue. Save any tissue that you pass. Take it to your doctor's office as soon as you can. Watch closely for changes in your health, and be sure to contact your doctor if:  · You have new or worse vaginal discharge. · You do not get better as expected. Where can you learn more? Go to http://adam-nelson.info/. Enter K746 in the search box to learn more about \"Threatened Miscarriage: Care Instructions. \"  Current as of: March 16, 2017  Content Version: 11.3  © 4183-6344 [employer]. Care instructions adapted under license by [employer] (which disclaims liability or warranty for this information). If you have questions about a medical condition or this instruction, always ask your healthcare professional. [place] [address] any warranty or liability for your use of this information. Urinary Tract Infection in Women: Care Instructions  Your Care Instructions    A urinary tract infection, or UTI, is a general term for an infection anywhere between the kidneys and the urethra (where urine comes out). Most UTIs are bladder infections. They often cause pain or burning when you urinate. UTIs are caused by bacteria and can be cured with antibiotics. Be sure to complete your treatment so that the infection goes away. Follow-up care is a key part of your treatment and safety. Be sure to make and go to all appointments, and call your doctor if you are having problems. It's also a good idea to know your test results and keep a list of the medicines you take. How can you care for yourself at home? · Take your antibiotics as directed.  Do not stop taking them just because you feel better. You need to take the full course of antibiotics. · Drink extra water and other fluids for the next day or two. This may help wash out the bacteria that are causing the infection. (If you have kidney, heart, or liver disease and have to limit fluids, talk with your doctor before you increase your fluid intake.)  · Avoid drinks that are carbonated or have caffeine. They can irritate the bladder. · Urinate often. Try to empty your bladder each time. · To relieve pain, take a hot bath or lay a heating pad set on low over your lower belly or genital area. Never go to sleep with a heating pad in place. To prevent UTIs  · Drink plenty of water each day. This helps you urinate often, which clears bacteria from your system. (If you have kidney, heart, or liver disease and have to limit fluids, talk with your doctor before you increase your fluid intake.)  · Urinate when you need to. · Urinate right after you have sex. · Change sanitary pads often. · Avoid douches, bubble baths, feminine hygiene sprays, and other feminine hygiene products that have deodorants. · After going to the bathroom, wipe from front to back. When should you call for help? Call your doctor now or seek immediate medical care if:  · Symptoms such as fever, chills, nausea, or vomiting get worse or appear for the first time. · You have new pain in your back just below your rib cage. This is called flank pain. · There is new blood or pus in your urine. · You have any problems with your antibiotic medicine. Watch closely for changes in your health, and be sure to contact your doctor if:  · You are not getting better after taking an antibiotic for 2 days. · Your symptoms go away but then come back. Where can you learn more? Go to http://adam-nelson.info/. Enter H116 in the search box to learn more about \"Urinary Tract Infection in Women: Care Instructions. \"  Current as of: November 28, 2016  Content Version: 11.3  © 5129-1184 [employer]. Care instructions adapted under license by [employer] (which disclaims liability or warranty for this information). If you have questions about a medical condition or this instruction, always ask your healthcare professional. [address] any warranty or liability for your use of this information.

## 2017-09-22 LAB
BACTERIA SPEC CULT: ABNORMAL
C TRACH DNA SPEC QL NAA+PROBE: NEGATIVE
CC UR VC: ABNORMAL
N GONORRHOEA DNA SPEC QL NAA+PROBE: NEGATIVE
SAMPLE TYPE: NORMAL
SERVICE CMNT-IMP: ABNORMAL
SERVICE CMNT-IMP: NORMAL
SPECIMEN SOURCE: NORMAL

## 2017-09-22 PROCEDURE — 99284 EMERGENCY DEPT VISIT MOD MDM: CPT

## 2017-09-23 ENCOUNTER — APPOINTMENT (OUTPATIENT)
Dept: ULTRASOUND IMAGING | Age: 27
End: 2017-09-23
Attending: EMERGENCY MEDICINE
Payer: COMMERCIAL

## 2017-09-23 ENCOUNTER — HOSPITAL ENCOUNTER (EMERGENCY)
Age: 27
Discharge: HOME | End: 2017-09-23
Attending: EMERGENCY MEDICINE
Payer: COMMERCIAL

## 2017-09-23 VITALS
HEART RATE: 79 BPM | SYSTOLIC BLOOD PRESSURE: 121 MMHG | RESPIRATION RATE: 16 BRPM | DIASTOLIC BLOOD PRESSURE: 69 MMHG | TEMPERATURE: 97.9 F | WEIGHT: 171.3 LBS | HEIGHT: 64 IN | BODY MASS INDEX: 29.24 KG/M2 | OXYGEN SATURATION: 100 %

## 2017-09-23 DIAGNOSIS — N13.30 HYDRONEPHROSIS, UNSPECIFIED HYDRONEPHROSIS TYPE: Primary | ICD-10-CM

## 2017-09-23 LAB
APPEARANCE UR: CLEAR
BILIRUB UR QL: NEGATIVE
COLOR UR: NORMAL
CREAT SERPL-MCNC: 0.42 MG/DL (ref 0.55–1.02)
GLUCOSE UR STRIP.AUTO-MCNC: NEGATIVE MG/DL
HGB UR QL STRIP: NEGATIVE
KETONES UR QL STRIP.AUTO: NEGATIVE MG/DL
LEUKOCYTE ESTERASE UR QL STRIP.AUTO: NEGATIVE
NITRITE UR QL STRIP.AUTO: NEGATIVE
PH UR STRIP: 6.5 [PH] (ref 5–8)
PROT UR STRIP-MCNC: NEGATIVE MG/DL
SP GR UR REFRACTOMETRY: 1.01 (ref 1–1.03)
UROBILINOGEN UR QL STRIP.AUTO: 1 EU/DL (ref 0.2–1)

## 2017-09-23 PROCEDURE — 36415 COLL VENOUS BLD VENIPUNCTURE: CPT | Performed by: EMERGENCY MEDICINE

## 2017-09-23 PROCEDURE — 82565 ASSAY OF CREATININE: CPT | Performed by: EMERGENCY MEDICINE

## 2017-09-23 PROCEDURE — 76770 US EXAM ABDO BACK WALL COMP: CPT

## 2017-09-23 PROCEDURE — 81003 URINALYSIS AUTO W/O SCOPE: CPT | Performed by: EMERGENCY MEDICINE

## 2017-09-23 PROCEDURE — 74011250637 HC RX REV CODE- 250/637: Performed by: EMERGENCY MEDICINE

## 2017-09-23 RX ORDER — ACETAMINOPHEN 325 MG/1
650 TABLET ORAL ONCE
Status: COMPLETED | OUTPATIENT
Start: 2017-09-23 | End: 2017-09-23

## 2017-09-23 RX ORDER — NITROFURANTOIN 25; 75 MG/1; MG/1
100 CAPSULE ORAL
Status: COMPLETED | OUTPATIENT
Start: 2017-09-23 | End: 2017-09-23

## 2017-09-23 RX ORDER — NITROFURANTOIN 25; 75 MG/1; MG/1
100 CAPSULE ORAL 2 TIMES DAILY
Qty: 14 CAP | Refills: 0 | Status: SHIPPED | OUTPATIENT
Start: 2017-09-23 | End: 2017-09-30

## 2017-09-23 RX ADMIN — NITROFURANTOIN MONOHYDRATE/MACROCRYSTALLINE 100 MG: 25; 75 CAPSULE ORAL at 01:26

## 2017-09-23 RX ADMIN — ACETAMINOPHEN 650 MG: 325 TABLET ORAL at 01:26

## 2017-09-23 NOTE — DISCHARGE INSTRUCTIONS
Learning About Hydronephrosis  What is hydronephrosis? Hydronephrosis is swelling of the kidneys. It is caused by a buildup of urine. This condition can happen if a tube that drains urine from your kidneys is blocked. The blockage can come from within the urinary tract or from pressure outside of the tract. Pregnancy is an example of an outside (external) cause. This condition is often caused by a blockage such as a kidney stone, tumor, or blood clot. It also can be caused by a problem in your urinary system that you were born with (congenital problem). What are the symptoms? Some of the common symptoms are:  · Pain in one or both sides. · Stomach pain. · Blood in your urine. Some people have no symptoms. How is it diagnosed? Your doctor will do an ultrasound to look for a blockage in your urinary system. An ultrasound allows your doctor to see a picture of the organs and other structures in your belly (abdomen). You also may need blood and urine tests. How is it treated? Your treatment depends on the cause of the swelling. If it is caused by a blockage, your treatment will depend on the type of blockage you have. If the blockage is caused by a kidney stone, you may wait for the stone to pass. If hydronephrosis happens during pregnancy, it usually clears up on its own. You may need to have urine drained from your bladder or kidneys. A urinary catheter is a small, flexible tube that can be inserted through the urethra and into the bladder, allowing urine to drain. A nephrostomy catheter is a thin tube placed into your kidney to drain urine. Sometimes surgery is needed to clear the blockage. If you have a blockage, you should begin to feel better after the blockage is gone. Many people recover and have no long-term problems. But some may have kidney damage. If hydronephrosis was left untreated for a long time, the damage can be severe. Severe damage will require further treatment.   Follow-up care is a key part of your treatment and safety. Be sure to make and go to all appointments, and call your doctor if you are having problems. It's also a good idea to know your test results and keep a list of the medicines you take. Where can you learn more? Go to http://adam-nelson.info/. Enter S386 in the search box to learn more about \"Learning About Hydronephrosis. \"  Current as of: Komal 15, 2016  Content Version: 11.3  © 2236-5753 [employer]. Care instructions adapted under license by "[employer] (which disclaims liability or warranty for this information). If you have questions about a medical condition or this instruction, always ask your healthcare professional. [address] any warranty or liability for your use of this information.

## 2017-09-23 NOTE — ED PROVIDER NOTES
HPI Comments: Malik Mathis is a 33 y.o. female, pmhx significant for chlamydia, CKD, and pyelonephritis, who presents ambulatory with  to the ED c/o persistent aching right flank pain x 3 days. Pt notes associated dysuria x today. Pt was seen 3 days ago for similar symptoms, was diagnosed with a UTI, and rx'd Keflex. Pt states that she's been taking the antibiotics without relief. Pt notes h/o hydronephrosis in the past, and wants to be checked for it now. Pt is currently 17 weeks pregnant, and denies abdominal pain/cramping, vaginal bleeding or discharge. Pt also denies fever, chills, nausea, and vomiting. PCP: None  OB/GYN: Dr. Sherman Odell    PSHx: cholecystectomy, appendectomy   Social History:  (+) Tobacco (0.25 ppd),   (+) EtOH, (-) Drugs   There are no other complaints, changes, or physical findings at this time. The history is provided by the patient. No  was used. Past Medical History:   Diagnosis Date    Abnormal Pap smear     chlamydia at beginning of preg; treated at that time    Chronic kidney disease     She thinks she has CKD or \"small kidneys\"     HX OTHER MEDICAL      x2    Pyelonephritis 2015    Rhesus isoimmunization unspecified as to episode of care in pregnancy     o negative       Past Surgical History:   Procedure Laterality Date    HX APPENDECTOMY      HX CHOLECYSTECTOMY           Family History:   Problem Relation Age of Onset    Hypertension Mother     Stroke Mother     Diabetes Father     Stroke Father        Social History     Social History    Marital status: LEGALLY      Spouse name: N/A    Number of children: N/A    Years of education: N/A     Occupational History    Not on file.      Social History Main Topics    Smoking status: Current Every Day Smoker     Packs/day: 0.25     Years: 2.00    Smokeless tobacco: Not on file      Comment: nicotine patch, education on smoking and dental problems    Alcohol use Yes Comment: occ    Drug use: No    Sexual activity: Yes     Partners: Male     Birth control/ protection: Inserts, Pill     Other Topics Concern    Not on file     Social History Narrative         ALLERGIES: Morphine; Nsaids (non-steroidal anti-inflammatory drug); and Tramadol    Review of Systems   Constitutional: Negative for activity change, appetite change, chills, fever and unexpected weight change. HENT: Negative for congestion. Eyes: Negative for pain and visual disturbance. Respiratory: Negative for cough and shortness of breath. Cardiovascular: Negative for chest pain. Gastrointestinal: Negative for abdominal pain, diarrhea, nausea and vomiting. Genitourinary: Positive for dysuria and flank pain. Negative for vaginal bleeding and vaginal discharge. Musculoskeletal: Negative for back pain. Skin: Negative for rash. Neurological: Negative for headaches. Vitals:    09/23/17 0000   BP: 115/68   Resp: 16   Temp: 97.9 °F (36.6 °C)   SpO2: 100%   Weight: 77.7 kg (171 lb 4.8 oz)   Height: 5' 4\" (1.626 m)            Physical Exam   Constitutional: She is oriented to person, place, and time. She appears well-developed and well-nourished. HENT:   Head: Normocephalic and atraumatic. Mouth/Throat: Oropharynx is clear and moist.   Eyes: Conjunctivae and EOM are normal. Pupils are equal, round, and reactive to light. Right eye exhibits no discharge. Left eye exhibits no discharge. Neck: Normal range of motion. Neck supple. Cardiovascular: Normal rate and normal heart sounds. No murmur heard. Pulmonary/Chest: Effort normal and breath sounds normal. No respiratory distress. She has no wheezes. She has no rales. Abdominal: Soft. Bowel sounds are normal. There is no tenderness. Gravid uterus  No CVA tenderness BL   Musculoskeletal: Normal range of motion. Neurological: She is alert and oriented to person, place, and time. No cranial nerve deficit. She exhibits normal muscle tone. Skin: Skin is warm and dry. No rash noted. She is not diaphoretic. Nursing note and vitals reviewed. MDM  Number of Diagnoses or Management Options  Hydronephrosis, unspecified hydronephrosis type:   Diagnosis management comments: Well appearing opiate dependent female, without fevers or vomiting, presenting with continued flank pain despite oxycodone. Pt appears well without evidence of sepsis. Amount and/or Complexity of Data Reviewed  Tests in the radiology section of CPT®: ordered and reviewed  Review and summarize past medical records: yes    Patient Progress  Patient progress: stable    ED Course       Procedures    Progress Note:  2:30 AM  Pt took the Tylenol, but is requesting more pain medication at this time, as well as cranberry juice. Explained I dont feel it is safe at this time for the fetus given normal HR and exam.   Written by Cassius Bell, ED Scribe, as dictated by Demetrius Rendon MD.     Progress Note:  3:37 AM  Discussed workup results/findings, and counseled pt regarding her diagnosis. Pt has been encouraged to follow-up as instructed. All questions have been answered, and pt conveyed understanding.   Written by Cassius Bell, ED Scribe, as dictated by Demetrius Rendon MD.    LABORATORY TESTS:  Recent Results (from the past 12 hour(s))   CREATININE    Collection Time: 09/23/17  2:14 AM   Result Value Ref Range    Creatinine 0.42 (L) 0.55 - 1.02 MG/DL    GFR est AA >60 >60 ml/min/1.73m2    GFR est non-AA >60 >60 ml/min/1.73m2   URINALYSIS W/ RFLX MICROSCOPIC    Collection Time: 09/23/17  2:14 AM   Result Value Ref Range    Color YELLOW/STRAW      Appearance CLEAR CLEAR      Specific gravity 1.008 1.003 - 1.030      pH (UA) 6.5 5.0 - 8.0      Protein NEGATIVE  NEG mg/dL    Glucose NEGATIVE  NEG mg/dL    Ketone NEGATIVE  NEG mg/dL    Bilirubin NEGATIVE  NEG      Blood NEGATIVE  NEG      Urobilinogen 1.0 0.2 - 1.0 EU/dL    Nitrites NEGATIVE  NEG Leukocyte Esterase NEGATIVE  NEG         IMAGING RESULTS:  US RETROPERITONEUM COMP   Final Result     EXAM:  US RETROPERITONEUM COMP     INDICATION:  uti, flank pain r/o hydro and stone     COMPARISON: None.     TECHNIQUE:  Real-time sonography of the kidneys, retroperitoneum and bladder was performed  with multiple static images obtained.     FINDINGS:  The kidneys have normal echogenicity with no mass or stone. There is moderate  bilateral hydronephrosis. . The right kidney measures 13 cm and the left kidney  measures 13.5 cm in length. There is suggestion of increased echogenicity renal  periods may are present medullary nephrocalcinosis.     The aorta tapers normally. The proximal iliac arteries is obscured by bowel gas   The IVC is normal. No retroperitoneal mass is identified.     The urinary bladder is normal.     IMPRESSION  IMPRESSION: There is moderate bilateral hydronephrosis. MEDICATIONS GIVEN:  Medications   acetaminophen (TYLENOL) tablet 650 mg (650 mg Oral Given 9/23/17 0126)   nitrofurantoin (macrocrystal-monohydrate) (MACROBID) capsule 100 mg (100 mg Oral Given 9/23/17 0126)       IMPRESSION:  1. Hydronephrosis, unspecified hydronephrosis type        PLAN:  1. Discharge home. Current Discharge Medication List      START taking these medications    Details   nitrofurantoin, macrocrystal-monohydrate, (MACROBID) 100 mg capsule Take 1 Cap by mouth two (2) times a day for 7 days. Qty: 14 Cap, Refills: 0           2. Follow-up Information     Follow up With Details Comments Contact Info    [facility] EMERGENCY DEPT  If symptoms worsen with fevers or vomiting. [address] [place] [address]  [phone]        3. Return to ED if worse       DISCHARGE NOTE:  3:37 AM  Patient's results have been reviewed with them.  Patient and/or family have verbally conveyed their understanding and agreement of the patient's signs, symptoms, diagnosis, treatment and prognosis and additionally agree to follow up as recommended or return to the Emergency Room should their condition change prior to follow-up. Discharge instructions have also been provided to the patient with some educational information regarding their diagnosis as well a list of reasons why they would want to return to the ER prior to their follow-up appointment should their condition change. This note is prepared by Maia Dang, acting as Scribe for MD Gely Vega MD: The scribe's documentation has been prepared under my direction and personally reviewed by me in its entirety. I confirm that the note above accurately reflects all work, treatment, procedures, and medical decision making performed by me.

## 2017-10-05 LAB
CHLAMYDIA, EXTERNAL: NEGATIVE
N. GONORRHEA, EXTERNAL: NEGATIVE

## 2018-01-11 LAB
ANTIBODY SCREEN, EXTERNAL: NEGATIVE
T. PALLIDUM, EXTERNAL: NEGATIVE

## 2018-01-30 ENCOUNTER — HOSPITAL ENCOUNTER (INPATIENT)
Age: 28
LOS: 7 days | Discharge: HOME | DRG: 560 | End: 2018-02-07
Attending: EMERGENCY MEDICINE | Admitting: OBSTETRICS & GYNECOLOGY
Payer: COMMERCIAL

## 2018-01-30 DIAGNOSIS — R10.9 ACUTE FLANK PAIN: Primary | ICD-10-CM

## 2018-01-30 DIAGNOSIS — Z3A.36 36 WEEKS GESTATION OF PREGNANCY: ICD-10-CM

## 2018-01-30 DIAGNOSIS — R34 DECREASED URINE OUTPUT: ICD-10-CM

## 2018-01-30 PROBLEM — O23.43 UTI IN PREGNANCY, ANTEPARTUM, THIRD TRIMESTER: Status: ACTIVE | Noted: 2018-01-30

## 2018-01-30 LAB
APPEARANCE UR: ABNORMAL
BACTERIA URNS QL MICRO: ABNORMAL /HPF
BILIRUB UR QL: NEGATIVE
COLOR UR: ABNORMAL
EPITH CASTS URNS QL MICRO: ABNORMAL /LPF
ERYTHROCYTE [DISTWIDTH] IN BLOOD BY AUTOMATED COUNT: 14.1 % (ref 11.5–14.5)
GLUCOSE UR STRIP.AUTO-MCNC: NEGATIVE MG/DL
HCT VFR BLD AUTO: 29.7 % (ref 35–47)
HGB BLD-MCNC: 10 G/DL (ref 11.5–16)
HGB UR QL STRIP: NEGATIVE
KETONES UR QL STRIP.AUTO: NEGATIVE MG/DL
LEUKOCYTE ESTERASE UR QL STRIP.AUTO: ABNORMAL
MCH RBC QN AUTO: 29.9 PG (ref 26–34)
MCHC RBC AUTO-ENTMCNC: 33.7 G/DL (ref 30–36.5)
MCV RBC AUTO: 88.7 FL (ref 80–99)
NITRITE UR QL STRIP.AUTO: POSITIVE
NRBC # BLD: 0 K/UL (ref 0–0.01)
NRBC BLD-RTO: 0 PER 100 WBC
PH UR STRIP: 7 [PH] (ref 5–8)
PLATELET # BLD AUTO: 203 K/UL (ref 150–400)
PMV BLD AUTO: 9.9 FL (ref 8.9–12.9)
PROT UR STRIP-MCNC: NEGATIVE MG/DL
RBC # BLD AUTO: 3.35 M/UL (ref 3.8–5.2)
RBC #/AREA URNS HPF: ABNORMAL /HPF (ref 0–5)
SP GR UR REFRACTOMETRY: 1.02 (ref 1–1.03)
UA: UC IF INDICATED,UAUC: ABNORMAL
UROBILINOGEN UR QL STRIP.AUTO: 1 EU/DL (ref 0.2–1)
WBC # BLD AUTO: 9 K/UL (ref 3.6–11)
WBC URNS QL MICRO: >100 /HPF (ref 0–4)

## 2018-01-30 PROCEDURE — 74011250636 HC RX REV CODE- 250/636: Performed by: OBSTETRICS & GYNECOLOGY

## 2018-01-30 PROCEDURE — 77030034849

## 2018-01-30 PROCEDURE — 4A1H74Z MONITORING OF PRODUCTS OF CONCEPTION, CARDIAC ELECTRICAL ACTIVITY, VIA NATURAL OR ARTIFICIAL OPENING: ICD-10-PCS | Performed by: OBSTETRICS & GYNECOLOGY

## 2018-01-30 PROCEDURE — 81001 URINALYSIS AUTO W/SCOPE: CPT | Performed by: OBSTETRICS & GYNECOLOGY

## 2018-01-30 PROCEDURE — 87077 CULTURE AEROBIC IDENTIFY: CPT | Performed by: OBSTETRICS & GYNECOLOGY

## 2018-01-30 PROCEDURE — 59025 FETAL NON-STRESS TEST: CPT

## 2018-01-30 PROCEDURE — 36415 COLL VENOUS BLD VENIPUNCTURE: CPT | Performed by: OBSTETRICS & GYNECOLOGY

## 2018-01-30 PROCEDURE — 87086 URINE CULTURE/COLONY COUNT: CPT | Performed by: OBSTETRICS & GYNECOLOGY

## 2018-01-30 PROCEDURE — 85027 COMPLETE CBC AUTOMATED: CPT | Performed by: OBSTETRICS & GYNECOLOGY

## 2018-01-30 PROCEDURE — 87186 SC STD MICRODIL/AGAR DIL: CPT | Performed by: OBSTETRICS & GYNECOLOGY

## 2018-01-30 PROCEDURE — 65410000002 HC RM PRIVATE OB

## 2018-01-30 PROCEDURE — 75810000275 HC EMERGENCY DEPT VISIT NO LEVEL OF CARE

## 2018-01-30 PROCEDURE — 74011250637 HC RX REV CODE- 250/637: Performed by: OBSTETRICS & GYNECOLOGY

## 2018-01-30 PROCEDURE — 99218 HC RM OBSERVATION: CPT

## 2018-01-30 RX ORDER — FAMOTIDINE 20 MG/1
20 TABLET, FILM COATED ORAL
Status: DISCONTINUED | OUTPATIENT
Start: 2018-01-30 | End: 2018-02-07 | Stop reason: HOSPADM

## 2018-01-30 RX ORDER — SODIUM CHLORIDE 0.9 % (FLUSH) 0.9 %
5-10 SYRINGE (ML) INJECTION EVERY 8 HOURS
Status: DISCONTINUED | OUTPATIENT
Start: 2018-01-30 | End: 2018-02-07 | Stop reason: HOSPADM

## 2018-01-30 RX ORDER — SODIUM CHLORIDE, SODIUM LACTATE, POTASSIUM CHLORIDE, CALCIUM CHLORIDE 600; 310; 30; 20 MG/100ML; MG/100ML; MG/100ML; MG/100ML
75 INJECTION, SOLUTION INTRAVENOUS CONTINUOUS
Status: DISCONTINUED | OUTPATIENT
Start: 2018-01-30 | End: 2018-02-07 | Stop reason: HOSPADM

## 2018-01-30 RX ORDER — ZOLPIDEM TARTRATE 5 MG/1
5 TABLET ORAL
Status: DISCONTINUED | OUTPATIENT
Start: 2018-01-30 | End: 2018-02-07 | Stop reason: HOSPADM

## 2018-01-30 RX ORDER — SWAB
1 SWAB, NON-MEDICATED MISCELLANEOUS DAILY
Status: DISCONTINUED | OUTPATIENT
Start: 2018-01-31 | End: 2018-02-07 | Stop reason: HOSPADM

## 2018-01-30 RX ORDER — ONDANSETRON 4 MG/1
4 TABLET, ORALLY DISINTEGRATING ORAL
Status: DISCONTINUED | OUTPATIENT
Start: 2018-01-30 | End: 2018-02-07 | Stop reason: HOSPADM

## 2018-01-30 RX ORDER — DOCUSATE SODIUM 100 MG/1
100 CAPSULE, LIQUID FILLED ORAL
Status: DISCONTINUED | OUTPATIENT
Start: 2018-01-30 | End: 2018-02-07 | Stop reason: HOSPADM

## 2018-01-30 RX ORDER — PHENAZOPYRIDINE HYDROCHLORIDE 100 MG/1
200 TABLET, FILM COATED ORAL
Status: DISCONTINUED | OUTPATIENT
Start: 2018-01-30 | End: 2018-02-07 | Stop reason: HOSPADM

## 2018-01-30 RX ORDER — DIPHENHYDRAMINE HCL 25 MG
25 CAPSULE ORAL
Status: DISCONTINUED | OUTPATIENT
Start: 2018-01-30 | End: 2018-02-07 | Stop reason: HOSPADM

## 2018-01-30 RX ORDER — SODIUM CHLORIDE 0.9 % (FLUSH) 0.9 %
5-10 SYRINGE (ML) INJECTION AS NEEDED
Status: DISCONTINUED | OUTPATIENT
Start: 2018-01-30 | End: 2018-02-07 | Stop reason: HOSPADM

## 2018-01-30 RX ORDER — ACETAMINOPHEN 325 MG/1
650 TABLET ORAL
Status: DISCONTINUED | OUTPATIENT
Start: 2018-01-30 | End: 2018-02-01

## 2018-01-30 RX ADMIN — PHENAZOPYRIDINE HYDROCHLORIDE 200 MG: 100 TABLET ORAL at 22:49

## 2018-01-30 RX ADMIN — VANCOMYCIN HYDROCHLORIDE 1000 MG: 1 INJECTION, POWDER, LYOPHILIZED, FOR SOLUTION INTRAVENOUS at 23:20

## 2018-01-30 RX ADMIN — SODIUM CHLORIDE 1000 ML: 900 INJECTION, SOLUTION INTRAVENOUS at 20:00

## 2018-01-30 RX ADMIN — ACETAMINOPHEN 650 MG: 325 TABLET ORAL at 22:49

## 2018-01-30 RX ADMIN — ZOLPIDEM TARTRATE 5 MG: 5 TABLET ORAL at 23:43

## 2018-01-30 RX ADMIN — SODIUM CHLORIDE, SODIUM LACTATE, POTASSIUM CHLORIDE, AND CALCIUM CHLORIDE 75 ML/HR: 600; 310; 30; 20 INJECTION, SOLUTION INTRAVENOUS at 22:37

## 2018-01-30 NOTE — IP AVS SNAPSHOT
Summary of Care Report The Summary of Care report has been created to help improve care coordination. Users with access to [employer] or [facility] (Web-based application) may access additional patient information including the Discharge Summary. If you are not currently a [address] user and need more information, please call the number listed below in the [place] [address] section and ask to be connected with Medical Records. Facility Information Name Address Phone [address] [address] [phone] Patient Information Patient Name Sex BRETT Nieto ([id number]) Female 1990 Discharge Information Admitting Provider Service Area Unit Raul Tapia MD / [phone] [facility] 3 Mother Infant / [phone] Discharge Provider Discharge Date/Time Discharge Disposition Destination (none) 2018 (Pending) AHR (none) Patient Language Language ENGLISH [13] Hospital Problems as of 2018  Reviewed: 2015  7:47 AM by Jonah Nelson MD  
  
  
  
 Class Noted - Resolved Last Modified POA Active Problems Abdominal pressure  2018 - Present 2018 by Jayshree Canela MD Unknown Entered by Jayshree Canela MD  
  Flank pain  2018 - Present 2018 by Raul Tapia MD Unknown Entered by Raul Tapia MD  
  
Non-Hospital Problems as of 2018  Reviewed: 2015  7:47 AM by Jonah Nelson MD  
  
  
  
 Class Noted - Resolved Last Modified Active Problems Pyelonephritis  2015 - Present 2015   Entered by Halley Gilbert MD  
  Pregnancy related back pain in third trimester, antepartum Present on Admission 3/22/2015 - Present 3/22/2015 by Todd Poole MD  
  Entered by Todd Poole MD  
 Pregnancy  4/8/2015 - Present 4/8/2015 by Helder Castro MD  
  Entered by Helder Castro MD  
  
You are allergic to the following Allergen Reactions Morphine Other (comments) Causes muscle aches Nsaids (Non-Steroidal Anti-Inflammatory Drug) Other (comments) Pt reports kidney issues Tramadol Seizures Current Discharge Medication List  
  
START taking these medications Dose & Instructions Dispensing Information Comments HYDROmorphone 4 mg tablet Commonly known as:  DILAUDID Dose:  4 mg Take 1 Tab by mouth every four (4) hours as needed. Max Daily Amount: 24 mg. Quantity:  20 Tab Refills:  0  
   
 ibuprofen 600 mg tablet Commonly known as:  MOTRIN Dose:  600 mg Take 1 Tab by mouth every six (6) hours as needed for Pain for up to 360 days. Quantity:  30 Tab Refills:  0  
   
 nitrofurantoin (macrocrystal-monohydrate) 100 mg capsule Commonly known as:  MACROBID Dose:  100 mg Take 1 Cap by mouth every twelve (12) hours for 4 days. Quantity:  8 Cap Refills:  0 ASK your doctor about these medications Dose & Instructions Dispensing Information Comments  
 cephALEXin 500 mg capsule Commonly known as:  Branda Burgess Dose:  500 mg Take 1 Cap by mouth three (3) times daily. Quantity:  15 Cap Refills:  0  
   
 ondansetron 4 mg disintegrating tablet Commonly known as:  ZOFRAN ODT Dose:  4 mg Take 1 Tab by mouth every eight (8) hours as needed for Nausea. Quantity:  20 Tab Refills:  0 PRENATAL VITAMIN PO Take  by mouth. Refills:  0 Current Immunizations Name Date Rho(D) Immune Globulin - IM 9/20/2017, 8/17/2014 Surgery Information ID Date/Time Status Primary Surgeon All Procedures Location [id number] 2/5/2018 Complete   [facility] - DO NOT SCHEDULE Follow-up Information Follow up With Details Comments Contact Info Provider Unknown   Patient not available to ask Discharge Instructions POST DELIVERY DISCHARGE INSTRUCTIONS Name: Susie Oconnor YOB: 1990 Primary Diagnosis: Active Problems: 
  Abdominal pressure (2018) Flank pain (2018) General:  
 
Diet/Diet Restrictions: 
· Eight 8-ounce glasses of fluid daily (water, juices); avoid excessive caffeine intake. · Meals/snacks as desired which are high in fiber and carbohydrates and low in fat and cholesterol. Medications:  
 
 
 
Physical Activity / Restrictions / Safety: · Avoid heavy lifting, no more that 8 lbs. For 2-3 weeks;  
· Limit use of stairs to 2 times daily for the first week home. · No driving for one week. · Avoid intercourse 4-6 weeks, no douching or tampon use. · Check with obstetrician before starting or resuming an exercise program.   
 
Discharge Instructions/Special Treatment/Home Care Needs:  
 
· Continue prenatal vitamins. · Continue to use squirt bottle with warm water on your episiotomy after each bathroom use until bleeding stops. · If steri-strips applied to your incision, remove in 7-10 days. Call your doctor for the following: · Fever over 101 degrees by mouth. · Vaginal bleeding heavier than a normal menstrual period or clots larger than a golf ball. · Red streaks or increased swelling of legs, painful red streaks on your breast. 
· Painful urination, constipation and increased pain or swelling or discharge with your incision. · If you feel extremely anxious or overwhelmed. · If you have thoughts of harming yourself and/or your baby. Pain Management:  
 
· Take Acetaminophen (Tylenol) or Ibuprofen (Advil, Motrin), as directed for pain. · Use a warm Sitz bath 3 times daily to relieve episiotomy or hemorrhoidal discomfort. · For hemorrhoidal discomfort, use Tucks and Anusol cream as needed and directed. · Heating pad to  incision as needed. Follow-Up Care:  
 
Appointment with MD: Follow-up Appointments Procedures  FOLLOW UP VISIT Appointment in: 3 - 5 Days With Dr Griggs Estimmary ann for urine test of cure and 6 weeks for postpartum check With [facility] in 1-2 weeks With Dr Griggs Estimable for urine test of cure and 6 weeks for postpartum check With [facility] in 1-2 weeks Standing Status:   Standing Number of Occurrences:   1 Order Specific Question:   Appointment in Answer:   3 - 5 Days Telephone number: [phone] Signed By: Kit Melgar MD                                                                                                   Date: 2018 Time: 9:31 AM 
 
MyChart Activation Thank you for requesting access to [employer]. Please follow the instructions below to securely access and download your online medical record. [employer] allows you to send messages to your doctor, view your test results, renew your prescriptions, schedule appointments, and more. How Do I Sign Up? 1. In your internet browser, go to [email or web address] 
2. Click on the First Time User? Click Here link in the Sign In box. You will be redirect to the New Member Sign Up page. 3. Enter your [employer] Access Code exactly as it appears below. You will not need to use this code after youve completed the sign-up process. If you do not sign up before the expiration date, you must request a new code. Sync[employer]ak Access Code: [email or web address] Expires: 2018 12:14 PM (This is the date your [employer] access code will ) 4. Enter the last four digits of your Social Security Number (xxxx) and Date of Birth (mm/dd/yyyy) as indicated and click Submit. You will be taken to the next sign-up page. 5. Create a S[employer]bak ID. This will be your [employer] login ID and cannot be changed, so think of one that is secure and easy to remember. 6. Create a S[employer]ak password. You can change your password at any time. 7. Enter your Password Reset Question and Answer. This can be used at a later time if you forget your password. 8. Enter your e-mail address. You will receive e-mail notification when new information is available in [address] 9. Click Sign Up. You can now view and download portions of your medical record. 10. Click the Download Summary menu link to download a portable copy of your medical information. Additional Information If you have questions, please visit the Frequently Asked Questions section of the [facility] website at [email or web address] Remember, [facility] is NOT to be used for urgent needs. For medical emergencies, dial 911. Chart Review Routing History Recipient Method Report Sent By Debra Carty MD  
[address] IP Auto Routed Notes Bethany Sahni MD [address] 11/26/2014 10:50 PM 11/26/2014 Nicole Carty MD  
[address] IP Auto Routed Notes Bethany Sahni MD [address] 1/5/2015  3:47 PM 01/05/2015 Nicole Carty MD  
[address] IP Auto Routed Notes MD Theresa Ferro 1/17/2015 10:45 AM 01/17/2015 Nicole Carty MD  
[address] IP Auto Routed Notes MD Maral Youngh Mira 3/22/2015  2:23 AM 03/22/2015 Nicole Carty MD  
[address] IP Auto Routed Notes Andrey Layton MD [[id number]] 4/5/2015  6:47 PM 04/05/2015 Nicole Carty MD  
[address] IP Auto Routed Notes Bethany Sahni MD [[id number]] 4/6/2015  8:17 AM 04/06/2015 Nicole Carty MD  
[address] IP Auto Routed Notes Omar Kaur MD [[id number]] 4/11/2015 10:23 AM 04/11/2015 Provider Unknown, MD  
Patient not available to ask [address] IP Auto Routed Notes Marisol Bryan MD [facility] 2/7/2018  9:33 AM 02/07/2018

## 2018-01-30 NOTE — IP AVS SNAPSHOT
[address] [facility] 
[phone] Patient: Eva Aldrich MRN: [id number] CGQ:5/83/[id number] About your hospitalization You were admitted on:  January 30, 2018 You last received care in the:  [facility] You were discharged on:  February 7, 2018 Why you were hospitalized Your primary diagnosis was:  Not on File Your diagnoses also included:  Abdominal Pressure, Uti In Pregnancy, Antepartum, Third Trimester, Flank Pain Follow-up Information Follow up With Details Comments Contact Info Provider Unknown   Patient not available to ask Discharge Orders None A check binu indicates which time of day the medication should be taken. My Medications START taking these medications Instructions Each Dose to Equal  
 Morning Noon Evening Bedtime HYDROmorphone 4 mg tablet Commonly known as:  DILAUDID Your last dose was: Your next dose is: Take 1 Tab by mouth every four (4) hours as needed. Max Daily Amount: 24 mg.  
 4 mg  
    
   
   
   
  
 ibuprofen 600 mg tablet Commonly known as:  MOTRIN Your last dose was: Your next dose is: Take 1 Tab by mouth every six (6) hours as needed for Pain for up to 360 days. 600 mg  
    
   
   
   
  
 nitrofurantoin (macrocrystal-monohydrate) 100 mg capsule Commonly known as:  MACROBID Your last dose was: Your next dose is: Take 1 Cap by mouth every twelve (12) hours for 4 days. 100 mg ASK your doctor about these medications Instructions Each Dose to Equal  
 Morning Noon Evening Bedtime  
 cephALEXin 500 mg capsule Commonly known as:  Maria Luz Short Your last dose was: Your next dose is: Take 1 Cap by mouth three (3) times daily.   
 500 mg  
    
   
   
   
  
 ondansetron 4 mg disintegrating tablet Commonly known as:  ZOFRAN ODT Your last dose was: Your next dose is: Take 1 Tab by mouth every eight (8) hours as needed for Nausea. 4 mg PRENATAL VITAMIN PO Your last dose was: Your next dose is: Take  by mouth. Where to Get Your Medications Information on where to get these meds will be given to you by the nurse or doctor. ! Ask your nurse or doctor about these medications HYDROmorphone 4 mg tablet  
 ibuprofen 600 mg tablet  
 nitrofurantoin (macrocrystal-monohydrate) 100 mg capsule Discharge Instructions POST DELIVERY DISCHARGE INSTRUCTIONS Name: Ford Lew YOB: 1990 Primary Diagnosis: Active Problems: 
  Abdominal pressure (1/30/2018) Flank pain (1/31/2018) General:  
 
Diet/Diet Restrictions: 
· Eight 8-ounce glasses of fluid daily (water, juices); avoid excessive caffeine intake. · Meals/snacks as desired which are high in fiber and carbohydrates and low in fat and cholesterol. Medications:  
 
 
 
Physical Activity / Restrictions / Safety: · Avoid heavy lifting, no more that 8 lbs. For 2-3 weeks;  
· Limit use of stairs to 2 times daily for the first week home. · No driving for one week. · Avoid intercourse 4-6 weeks, no douching or tampon use. · Check with obstetrician before starting or resuming an exercise program.   
 
Discharge Instructions/Special Treatment/Home Care Needs:  
 
· Continue prenatal vitamins. · Continue to use squirt bottle with warm water on your episiotomy after each bathroom use until bleeding stops. · If steri-strips applied to your incision, remove in 7-10 days. Call your doctor for the following: · Fever over 101 degrees by mouth. · Vaginal bleeding heavier than a normal menstrual period or clots larger than a golf ball. · Red streaks or increased swelling of legs, painful red streaks on your breast. 
· Painful urination, constipation and increased pain or swelling or discharge with your incision. · If you feel extremely anxious or overwhelmed. · If you have thoughts of harming yourself and/or your baby. Pain Management:  
 
· Take Acetaminophen (Tylenol) or Ibuprofen (Advil, Motrin), as directed for pain. · Use a warm Sitz bath 3 times daily to relieve episiotomy or hemorrhoidal discomfort. · For hemorrhoidal discomfort, use Tucks and Anusol cream as needed and directed. · Heating pad to  incision as needed. Follow-Up Care:  
 
Appointment with MD: Follow-up Appointments Procedures  FOLLOW UP VISIT Appointment in: 3 - 5 Days With Dr Luis Manuel Perez for urine test of cure and 6 weeks for postpartum [facility]2 weeks With Dr Luis Manuel Perez for urine test of cure and 6 weeks for postpartum check With [facility] in 1-2 weeks Standing Status:   Standing Number of Occurrences:   1 Order Specific Question:   Appointment in Answer:   3 - 5 Days Telephone number: [phone] Signed By: Sylvia Mendoza MD                                                                                                   Date: 2018 Time: 9:31 AM 
 
Bloggerce Activation Thank you for requesting access to Bloggerce. Please follow the instructions below to securely access and download your online medical record. Bloggerce allows you to send messages to your doctor, view your test results, renew your prescriptions, schedule appointments, and more. How Do I Sign Up? 1. In your internet browser, go to [email or web address] 
2. Click on the First Time User? Click Here link in the Sign In box. You will be redirect to the New Member Sign Up page. 3. Enter your [employer]gerce Access Code exactly as it appears below. You will not need to use this code after youve completed the sign-up process.  If you do not sign up before the expiration date, you must request a new code. [employer] Access Code: [email or web address] Expires: 2018 12:14 PM (This is the date your [employer] access code will ) 4. Enter the last four digits of your Social Security Number (xxxx) and Date of Birth (mm/dd/yyyy) as indicated and click Submit. You will be taken to the next sign-up page. 5. Create a [employer] ID. This will be your [employer] login ID and cannot be changed, so think of one that is secure and easy to remember. 6. Create a [employer] password. You can change your password at any time. 7. Enter your Password Reset Question and Answer. This can be used at a later time if you forget your password. 8. Enter your e-mail address. You will receive e-mail notification when new information is available in [address] 9. Click Sign Up. You can now view and download portions of your medical record. 10. Click the Download Summary menu link to download a portable copy of your medical information. Additional Information If you have questions, please visit the Frequently Asked Questions section of the [employer] website at [email or web address] Remember, [employer] is NOT to be used for urgent needs. For medical emergencies, dial 911. [employer] Announcement We are excited to announce that we are making your provider's discharge notes available to you in [employer]. You will see these notes when they are completed and signed by the physician that discharged you from your recent hospital stay. If you have any questions or concerns about any information you see in [employer], please call the Health Information Department where you were seen or reach out to your Primary Care Provider for more information about your plan of care. Introducing [facility] HE[facility]!    
 [facility] introduces [employer] patient portal. Now you can access parts of your medical record, email your doctor's office, and request medication refills online. 1. In your internet browser, go to [email or web address] 2. Click on the First Time User? Click Here link in the Sign In box. You will see the New Member Sign Up page. 3. Enter your [employer] Access Code exactly as it appears below. You will not need to use this code after youve completed the sign-up process. If you do not sign up before the expiration date, you must request a new code. · [employer] Access Code: [email or web address] Expires: 5/8/2018 12:14 PM 
 
4. Enter the last four digits of your Social Security Number (xxxx) and Date of Birth (mm/dd/yyyy) as indicated and click Submit. You will be taken to the next sign-up page. 5. Create a [employer] ID. This will be your [employer] login ID and cannot be changed, so think of one that is secure and easy to remember. 6. Create a [employer] password. You can change your password at any time. 7. Enter your Password Reset Question and Answer. This can be used at a later time if you forget your password. 8. Enter your e-mail address. You will receive e-mail notification when new information is available in [address] 9. Click Sign Up. You can now view and download portions of your medical record. 10. Click the Download Summary menu link to download a portable copy of your medical information. If you have questions, please visit the Frequently Asked Questions section of the [employer] website. Remember, [employer] is NOT to be used for urgent needs. For medical emergencies, dial 911. Now available from your iPhone and Android! Providers Seen During Your Hospitalization Provider Specialty Primary office phone Gricelda Bass MD Emergency Medicine [phone] John Ernandez MD Obstetrics & Gynecology [phone] Kit Melgar MD Obstetrics & Gynecology [phone] Your Primary Care Physician (PCP) Primary Care Physician Office Phone Office Fax UNKNOWN, PROVIDER ** None ** ** None ** You are allergic to the following Allergen Reactions Morphine Other (comments) Causes muscle aches Nsaids (Non-Steroidal Anti-Inflammatory Drug) Other (comments) Pt reports kidney issues Tramadol Seizures Recent Documentation Height Weight Breastfeeding? BMI OB Status Smoking Status 1.6 m 82.8 kg Yes 32.34 kg/m2 Recent pregnancy Current Every Day Smoker Emergency Contacts Name Discharge Info Relation Home Work Mobile Helga Pena N/A  AT THIS TIME [6] Mother [14] [phone] Patient Belongings The following personal items are in your possession at time of discharge: 
  Dental Appliances: None  Visual Aid: Glasses, At home      Home Medications: None   Jewelry: Ring, Body Piercing, With patient  Clothing: Footwear, Shirt, Pants    Other Valuables: At bedside, Cell Phone Please provide this summary of care documentation to your next provider. Signatures-by signing, you are acknowledging that this After Visit Summary has been reviewed with you and you have received a copy. Patient Signature:  ____________________________________________________________ Date:  ____________________________________________________________  
  
Vianey Sleeper Provider Signature:  ____________________________________________________________ Date:  ____________________________________________________________

## 2018-01-30 NOTE — IP AVS SNAPSHOT
[address] [facility] 
[phone] Patient: Ford Lew MRN: [id number] FNB:9/57/5666 A check binu indicates which time of day the medication should be taken. My Medications START taking these medications Instructions Each Dose to Equal  
 Morning Noon Evening Bedtime HYDROmorphone 4 mg tablet Commonly known as:  DILAUDID Your last dose was: Your next dose is: Take 1 Tab by mouth every four (4) hours as needed. Max Daily Amount: 24 mg.  
 4 mg  
    
   
   
   
  
 ibuprofen 600 mg tablet Commonly known as:  MOTRIN Your last dose was: Your next dose is: Take 1 Tab by mouth every six (6) hours as needed for Pain for up to 360 days. 600 mg  
    
   
   
   
  
 nitrofurantoin (macrocrystal-monohydrate) 100 mg capsule Commonly known as:  MACROBID Your last dose was: Your next dose is: Take 1 Cap by mouth every twelve (12) hours for 4 days. 100 mg ASK your doctor about these medications Instructions Each Dose to Equal  
 Morning Noon Evening Bedtime  
 cephALEXin 500 mg capsule Commonly known as:  [place] Your last dose was: Your next dose is: Take 1 Cap by mouth three (3) times daily. 500 mg  
    
   
   
   
  
 ondansetron 4 mg disintegrating tablet Commonly known as:  ZOFRAN ODT Your last dose was: Your next dose is: Take 1 Tab by mouth every eight (8) hours as needed for Nausea. 4 mg PRENATAL VITAMIN PO Your last dose was: Your next dose is: Take  by mouth. Where to Get Your Medications Information on where to get these meds will be given to you by the nurse or doctor. ! Ask your nurse or doctor about these medications HYDROmorphone 4 mg tablet  
 ibuprofen 600 mg tablet  
 nitrofurantoin (macrocrystal-monohydrate) 100 mg capsule

## 2018-01-31 ENCOUNTER — APPOINTMENT (OUTPATIENT)
Dept: ULTRASOUND IMAGING | Age: 28
DRG: 560 | End: 2018-01-31
Attending: OBSTETRICS & GYNECOLOGY
Payer: COMMERCIAL

## 2018-01-31 PROCEDURE — 96365 THER/PROPH/DIAG IV INF INIT: CPT

## 2018-01-31 PROCEDURE — 74011250637 HC RX REV CODE- 250/637: Performed by: OBSTETRICS & GYNECOLOGY

## 2018-01-31 PROCEDURE — 96366 THER/PROPH/DIAG IV INF ADDON: CPT

## 2018-01-31 PROCEDURE — 65410000002 HC RM PRIVATE OB

## 2018-01-31 PROCEDURE — 74011250636 HC RX REV CODE- 250/636: Performed by: OBSTETRICS & GYNECOLOGY

## 2018-01-31 PROCEDURE — 59025 FETAL NON-STRESS TEST: CPT

## 2018-01-31 PROCEDURE — 99284 EMERGENCY DEPT VISIT MOD MDM: CPT

## 2018-01-31 PROCEDURE — 76770 US EXAM ABDO BACK WALL COMP: CPT

## 2018-01-31 RX ORDER — VANCOMYCIN HYDROCHLORIDE
1250 EVERY 12 HOURS
Status: DISCONTINUED | OUTPATIENT
Start: 2018-01-31 | End: 2018-02-01

## 2018-01-31 RX ORDER — OXYCODONE AND ACETAMINOPHEN 5; 325 MG/1; MG/1
1 TABLET ORAL
Status: DISCONTINUED | OUTPATIENT
Start: 2018-01-31 | End: 2018-02-01

## 2018-01-31 RX ADMIN — VANCOMYCIN HYDROCHLORIDE 1000 MG: 1 INJECTION, POWDER, LYOPHILIZED, FOR SOLUTION INTRAVENOUS at 11:13

## 2018-01-31 RX ADMIN — VANCOMYCIN HYDROCHLORIDE 1250 MG: 10 INJECTION, POWDER, LYOPHILIZED, FOR SOLUTION INTRAVENOUS at 22:48

## 2018-01-31 RX ADMIN — OXYCODONE HYDROCHLORIDE AND ACETAMINOPHEN 1 TABLET: 5; 325 TABLET ORAL at 20:21

## 2018-01-31 RX ADMIN — PHENAZOPYRIDINE HYDROCHLORIDE 200 MG: 100 TABLET ORAL at 16:01

## 2018-01-31 RX ADMIN — ZOLPIDEM TARTRATE 5 MG: 5 TABLET ORAL at 21:05

## 2018-01-31 RX ADMIN — OXYCODONE HYDROCHLORIDE AND ACETAMINOPHEN 1 TABLET: 5; 325 TABLET ORAL at 06:19

## 2018-01-31 RX ADMIN — Medication 1 TABLET: at 11:42

## 2018-01-31 RX ADMIN — OXYCODONE HYDROCHLORIDE AND ACETAMINOPHEN 1 TABLET: 5; 325 TABLET ORAL at 10:11

## 2018-01-31 RX ADMIN — OXYCODONE HYDROCHLORIDE AND ACETAMINOPHEN 1 TABLET: 5; 325 TABLET ORAL at 01:58

## 2018-01-31 RX ADMIN — PHENAZOPYRIDINE HYDROCHLORIDE 200 MG: 100 TABLET ORAL at 11:41

## 2018-01-31 RX ADMIN — PHENAZOPYRIDINE HYDROCHLORIDE 200 MG: 100 TABLET ORAL at 20:21

## 2018-01-31 RX ADMIN — ACETAMINOPHEN 650 MG: 325 TABLET ORAL at 15:04

## 2018-01-31 RX ADMIN — Medication 10 ML: at 15:05

## 2018-01-31 RX ADMIN — OXYCODONE HYDROCHLORIDE AND ACETAMINOPHEN 1 TABLET: 5; 325 TABLET ORAL at 16:04

## 2018-01-31 NOTE — PROGRESS NOTES
36.5 WEEKS PT OF DR. Angel Irene ON TO UNIT FROM Home WITH COMPLAINTS OF r lower back pain, unable to void and pressure. PT STATES Pos FETAL MOV'T, No UC'S, No VAG BLEEDING, No ROM, No HA, No BLURRED VISION, Pos NAUSEA/VOMITTING. CONSENTS SIGNED AND REVIEWED. PT PLACED ON MONITOR .

## 2018-01-31 NOTE — ROUTINE PROCESS
0700- Bedside report obtained from Inocencio Perez RN. Assumed care. Vs and assessment completed. Pt is c/o flank pain mostly on right side, medicated by [facility] [place] [facility]. Will assess later. 0830- Pt has been sleeping. 1000- Dr Sea Gonzalez updated on pt's status. 1011- Medicated with one percocet for C/O low back and B flank pain. Order obtained for K pad. 1130- K pad applied on pt's back for comfort per pt. Pt reports some relief with percocet. Monitor applied for NST.  1200- Reactive NST, off the monitor. 200- Pt's  came out reported of pt having some white stuff coming out of her. Went to assess, had white vaginal discharge and some cl fluid. Dr Sea Gonzalez notified. Amnisure performed per mds order. Results negative. MD notified. Bed linen and gown changed. Pt was assured of the test and neg results. 1330- Pt to ultrasound via bed by transporter. 1500- Pt back from U/S. Medicated for pain per req. 1820- Ultrasound results reviewed by MD and spoke with pt. No significant change from previous U/S  Pt ok to remove Connors per md's suggestion. Deflated balloon and remove Connors, tip intact. 1910- SBAR report to Inocencio Perez RN given.

## 2018-01-31 NOTE — PROGRESS NOTES
Pt transferred for room 3182 on Antepartum unit for overnight stay. Pt to receive IV antibiotics and pyridium. Report to MAURIZIO Solis RNC and care of patient turned over at this time.

## 2018-01-31 NOTE — PROGRESS NOTES
Dr. Merrie Ormond called and made aware of patients arrival with c/o Right lower back /kidney pain and abd pressure. Order to obtain urine via straight cath and send to lab. Urine obtained and sent at this time. Continue to monitor. She will be out to see patient.

## 2018-01-31 NOTE — ADT AUTH CERT NOTES
Patient Demographics        Patient Name [address] Sex  Address Phone       Robbie Reyes [id number] Female 1990 [address] [phone] (Home) *Preferred*  [phone] (Mobile)           CSN:       [id number]           Admit Date: Admit Time Room Bed       2018  7:14 PM 3182 [50177]  [[id number]]           Attending Providers        Provider Pager From To       Ray Monteiro MD  18       Gumaro Minaya MD  18            Emergency Contact(s)        Name Relation Home Work Mobile     Margaret Haywood Mother [phone]           Utilization Review           Urologic Disease GRG - Care Day 2 (2018) by Raul Irvin        Review Entered Review Status       2018 Completed       Details              Care Day: 2 Care Date: 2018 Level of Care: Inpatient Floor       Guideline Day 1        Clinical Status       (X) * Clinical Indications met [C]       2018 3:02 PM EST by Kyaw Garcia Admittted to [facility] care . Shannon Fitzgeraldl ... ;  she continues to have right flank pain. Will leave Connors catheter in for now given urinary retention                     Interventions       (X) Inpatient interventions as needed       2018 3:02 PM EST by Ariella SALMERON; LR iv @ 75, percocet po x 3, pyridium po tid , PNV po , VANC iv q 12h.                                          * Milestone              Additional Notes       *Inpatient order .        ---vs; 98.3-75-18, bp 94/42, sat 98 RA.              ------MICRO; APPARENT STAPHYLOCOCCUS SPECIES. > 100,000 colonies. Hair Ramírez.       ------Retroperit US in process.        -------PER OB GYN;       NST reactive.        36w3d        Urinary tract infection-she has been on daily Macrobid for suppression due to h/o pyelonephritis, urine culture  showed Staph epidermidis but office was unable to reach her to have her increase Macrobid to treatment dose.  Started on Vancomycin here.        Urinary retention- garber placed last night       Back pain- h/o hydronephrosis               Plan:  Continue hospitalization                              Continue Vancomycin for UTI/possible pyelonephritis- culture pending                             Will evaluate with renal US given h/o hydronephrosis                             Will leave Garber catheter in for now given urinary retention               Orders/Charges: Medium               Patient states she continues to have right flank pain. No fever or chills. No contractions, VB, LOF. Active FM.           Urologic Disease GRG - Clinical Indications for Admission to Inpatient Care by Babatunde Chavez        Review Entered Review Status       1/31/2018 Completed       Details              Clinical Indications for Admission to Inpatient Care       Most Recent : Samson Guerra Most Recent Date: 1/31/2018 3:00 PM EST       (X) Hospital admission is needed for appropriate care of the patient because of 1 or more of the        following  (1) (2) (3) (4):          ( ) New-onset Reduced urine output, or hydronephrosis remaining after emergency or observation           level care (as appropriate)          1/31/2018 3:00 PM EST by Nathan Saini; Will evaluate with renal US given h/o hydronephrosis                 (X) Urologic infection requiring inpatient care indicated by 1 or more of the following  (31)           (32) (33) (34) (35) (36):             (X) Failure of outpatient treatment             1/31/2018 3:00 PM EST by Samson Guerra               Admittted to [facility] care Jan 30; Jan 31;  she continues to have right flank pain. Will leave Garber catheter in for now given urinary retention                                                 Additional Notes       Inpatient order Jan 31.

## 2018-01-31 NOTE — PROGRESS NOTES
Pharmacy Automatic Renal Dosing Protocol - Antimicrobials    Indication for Antimicrobials: UTI in 36 week pregnant patient    Current Regimen of Each Antimicrobial:  Vancomycin 1000 mg IV every 12 hours (Started 18; Day #1)    Previous Antimicrobial Therapy:  No inpatient antimicrobials previously    Goal Vancomycin Trough: 10-15 mcg/mL    Significant Cultures:   18 Urine culture = Greater than 100K CFU/mL apparent staph species (Results pending)  Per MD note, patient had outpatient urine culture on 18 with staph epidermidis (FINAL)    Paralysis, amputations, malnutrition: N/A    Labs:  Recent Labs      18   2238   WBC  9.0     Temp (24hrs), Av.1 °F (36.7 °C), Min:97.7 °F (36.5 °C), Max:98.3 °F (36.8 °C)    Creatinine Clearance (mL/min) or Dialysis: Greater than 90 mL/min    No results found for: PCT    Impression/Plan:   - Based on patient's indication, weight, and renal function, current vancomycin dose will achieve an estimated trough of ~8 mcg/mL. To achieve an estimate vancomycin trough of 10-15 mcg/mL, vancomycin dose adjusted to 1250 mg IV every 12 hours. Pharmacy will follow daily and adjust medications as appropriate for renal function and/or serum levels. Thank you,  Jovi Mckenzie, SHERYLD    Recommended duration of therapy  [email or web address]/Pharmacy/Clinical%20Companion/Duration%20of%20ABX%20therapy. docx    Renal Dosing  [email or web address]/[facility]/Pharmacy/Clinical%20Companion/Renal%[id number]. pdf

## 2018-01-31 NOTE — H&P
EDC:2018  EGA: 36 weeks, 2 days      Chief Complaint:  kidney pain. History of Present Illness:  34 y/o [id number] @ 36/2 weeks presents to L&D c/o kidney and decreased urine output x1 day. H/o hydronephrosis (induced for this indication in ). On macrobid suppression for h/o pyelonephritis. Recent UCx positive for UTI (Staph epidermidis & Strep viridans). Did not get the message to increase her macrobid to therapeutic dose. Now c/o 7/10 right \"kidney pain. \" No relief from tylenol. Admits to no hydration today given the feeling that she cannot void. Also c/o pelvic pressure - some relief since being straight cathed (urine very dark/concentrated). Denies contractions, LOF, VB. Excellent fetal movement. Pregnancy complicated by insufficient PN care, B/L hydronephrosis, h/o pyelonephritis (on suppressive macrobid), chlamydia (JOSE negative), anemia (iron suppression), Rh negative (s/p Rhogam 28wks), h/o shoulder dystocia x2. Growth scan 35/4 weeks: EFW 52% (6-1, 2742g), HC 34/6wks, AC 35/4wks, anterior/fundal placenta, vertex, CHET 16cm      Patient's Prenatal Care with Doctor of Record Gloria Shell MD Notable For -    Insomnia pregnant  h/o shoulder dystocia w/ 1st 2 pregnancies  Breast lump/mass upper outer quadrant LEFT  Bradycardia fetal  Late/insufficient prenatal care  Anemia on FE pregnant  Hydronephrosis,,mac supression,,uro consult___  Leg Ache: Pregnant  Chlamydia pregnant: JOSE: neg ([facility])  RH negative rhogam 28 wks:   Bilateral Hydronephrosis-pregnancy #3  medullary sponge kidney disease  Normal pregnancy multigravida  High risk pregnancy social problems,  @ [facility], protective order v FOB   lab screening          Impression & Recommendations:    Problem # 1:  Back pain in pregnancy (LTB-612.49) (ALX56-R23.89)  Likely related to late gestational age and h/o hydronephrosis. Probably exacerbated by UTI. No evidence of pyelonephritis.      Problem # 2:  UTI pregnant JOSE ____ (NDN-460.05) (DNW08-Q11.13)  Recent UCx consistent w/ oxacillin/PCN resistant Staph epidermidis UTI. Sensitive to macrobid. Increase macrobid to BID for 10 days. IV hydrate. Other Orders:  Sent to L&D (CPT-AdmitF)      Past Pregnancy History      :  6     Term Births:  3     Premature Births: 0     Living Children: 3     Para:   3     Mult. Births:  0     Prev : 0     Aborta:  2     Elect. Ab:  1     Spont. Ab:  1     Ectopics:  0    Pregnancy # 1     Delivery date:   2008     Weeks Gestation: 45      labor:   no     Delivery type:        Hours of labor:   6     Anesthesia type:   epidural     [facility]     Infant Sex:  Female     Birth weight:  8 lbs, 5oz     Name:  Hector Vega    Pregnancy # 2     Delivery date:   10/27/2010     Weeks Gestation: 40      labor:   no     Delivery type:        Hours of labor:   6     Anesthesia type:   epidural     Delivery location:   [facility]     Infant Sex:  Male     Birth weight:  8lbs, [address]     Name:  Skylar Merritt    Pregnancy # 3     Delivery date:   2015     Weeks Gestation: 40      labor:   no     Delivery type:        Hours of labor:   5     Anesthesia type:   epidural     Delivery location:   [address]     Infant Sex:  Male     Birth weight:  6lbs, [address]     Name:  Glenys Carr     Comments:  Induction for Hydronephrosis    Pregnancy # 4     Delivery date:   2015     Comments:  TAB    Pregnancy # 5     Delivery date:   2016     Comments:  sab, completed by D&C due to bleeding: Mal.         Past Medical History:     Reviewed history from 2017 and no changes required:        Menarche: 15        Menopause:          Bilateral Hydronephrosis-pregnancy #3        Small right kidney        Sponge kidney disease        ASCUS/HPV(+) 2014        Pylonephritis 2015        Chlamydia 2017    Past Surgical History:     Reviewed history from 2017 and no changes required:        Appendectomy         Cholecystectomy '        Vaginal Delivery         Vaginal Delivery         Vaginal Delivery  (pilo women's)         TAB 2015        missed   D&C     Family History Summary:      Reviewed history Last on 2018 and no changes required:2018      General Comments - FH:  Family history transferred to [address] [place] [address]      Social History:     Reviewed history from 2017 and no changes required:                [facility])        Stable Relationship with FOB Martha Brooks        Review of Systems        See HPI    Except as noted in the HPI, the review of systems is negative for General, Breast, , CV, Resp, Endo, MS, Derm, Neuro, Psych, Allergy and Heme.     Allergies    TRAMADOL HCL (TRAMADOL HCL TABS) (Critical)  MORPHINE (Severe)  NSAIDS (Moderate)      Medications Removed from Medication List        Flowsheet View for Follow-up Visit     Estimated weeks of        gestation:  39 2/7     Weight:  178     Cx Dilation:  1cm     Cx Effacement: 50%     Cx Station:  -3     Comment:  triage visit ED [facility] - \"kidney pain\"      Height:   65 inches  Weight:  178 pounds    BMI:   29.73 inches    Visit Vitals    /75 (BP 1 Location: Left arm, BP Patient Position: Sitting)    Pulse 81    Temp 98.1 °F (36.7 °C)    Resp 18    Ht 5' 3\" (1.6 m)    Wt 82.8 kg (182 lb 8.7 oz)    SpO2 97%    BMI 32.34 kg/m2       Physical Exam     General           General appearance:  no acute distress, lying comfortably in bed    Head           Inspection:   normal    Eyes           External:   EOM intact    ENT           Dental:   adequate dentition    Chest           Lungs:  clear to auscultation          Heart:  regular rate and rhythm    Extremeties           Extremeties:  0 edema    Neurological           Reflexes:  2+ and symmetric with no pathological reflexes    Psych           Orientation:  oriented to time, place, and person          Mood:  no appearance of anxiety, depression, or agitation    Lymph           Inguinal:  no inguinal adenopathy    Skin           Inspection:  no rashes, suspicious lesions, or ulcerations    Abdomen           Abdomen:  gravid, nontender, mild R lower back pain, no CVA tenderness    Pelvic Exam           EGBUS:  no lesions          Vagina:  normal appearing without lesions or discharge          Uterus:  gravid          Cervix:  no lesions or discharge                  Dilation: : 1cm                  Effacement:  50%                  Station:  -3                  Membranes:  intact    Norris: no ctx  FHR: 120, category 1        Impression & Recommendations:    Problem # 1:  Back pain in pregnancy (PEARL-687.61) (XGO29-J97.89)  Likely related to late gestational age and h/o hydronephrosis. Probably exacerbated by UTI. No evidence of pyelonephritis. Problem # 2:  UTI pregnant JOSE ____ (XUX-051.89) (EEB23-N22.13)  Recent UCx consistent w/ oxacillin/PCN resistant Staph epidermidis UTI. Sensitive to macrobid. Increase macrobid to BID for 10 days. IV hydrate.      Other Orders:  Sent to L&D (CPT-AdmitF)      Medications (at conclusion of this visit)    01/25/2018 AMBIEN 5 MG ORAL TABLET (ZOLPIDEM TARTRATE) 1 at bedtime as needed for sleep  01/11/2018 MACROBID 100 MG ORAL CAPSULE (NITROFURANTOIN MONOHYD MACRO) 1 by mouth daily  11/10/2017 ZOFRAN ODT 4 MG ORAL TABLET DISINTEGRATING (ONDANSETRON) 1 tablet on the tongue every 8 hours as needed for nausea  06/20/2017 PRENATAL PLUS 27-1 MG ORAL TABLET (PRENATAL VIT-FE FUMARATE-FA) 1 po each day          LABORATORY DATA   TEST DATE RESULT   Group B Strep culture 01/20/2016 *                                   (Group B Strep Culture Result Field)   Blood Type 06/20/2017 O                                             (Blood Type Result Field)   Rh 06/20/2017 Negative                                   (Rh Result Field)   Rhogam Inj Given 01/11/2018 full   Tdap Vaccine Given 01/11/2018 Vacc. [address]   Antibody Screen 01/11/2018 Negative   Rubella  [facility] Reference Ranges On or After 3/10/14                  <0.90              Non-immune      0.90 - 0.99     Equivocal      >0.99              Immune    [facility] Reference Ranges  Before 3/10/14           <5                 Non-immune             5 - 9               Equivocal            >9                 Immune  [facility] Reference Ranges       < Or = 0.90       Negative             0.91-1.09          Equivocal            > Or = 1.10       Positive   06/20/2017     8.88     TPA (T Pallidum Antibodies) 01/11/2018 Negative   Serology (RPR) 01/20/2016 *   HBsAg 06/20/2017 Negative   HIV 06/20/2017 Non Reactive   Hemoglobin 01/11/2018 10.9   Hematocrit 01/11/2018 31.9   Platelets 44/59/3044 231 X10E3/UL   TSH 01/20/2016 *   Urine Culture 10/13/2017 Negative   GC DNA Probe 10/05/2017 Negative   Chlamydia DNA 10/05/2017 Negative   PAP 06/20/2017 NIL   Flu Vaccine Given 09/25/2017 Vacc.  [address]   HGBA1C 07/07/2017 4.8   HGB Electro 12/21/2007 AA   T4, Free 01/20/2016 *   BG Fasting 01/20/2016 *   GTT 1H 50G 01/11/2018 108   GTT 1H 100G 01/20/2016 *   GTT 2H 100G 01/20/2016 *   GTT 3H 100G 01/20/2016 *   Glucose Plasma 01/20/2016 *   CF Accept or Decline 01/20/2016 *   CF Screen Result 12/21/2007 Negative   Nuchal Trans 08/29/2017 Too Late   AFP Only     Tetra 09/08/2017 *Screen Negative*   AFP Serum 01/20/2016 *   CVS 07/07/2017 declined   AFP Amniotic 01/20/2016 *   Amnio Karyo 07/07/2017 declined   FISH 01/20/2016 *   GC Culture 01/20/2016 *   Chlamydia Cult 01/20/2016 *   Ureaplasma     Mycoplasma     WBC 06/20/2017 4.5 X10E3/UL   RBC 06/20/2017 4.31 X10E6/UL   MCV 06/20/2017 89   MCH 06/20/2017 29.0   MCHC RBC 06/20/2017 32.6     ULTRASOUND DATA   TEST DATE RESULT   Estimated Fetal Weight 01/25/2018 2741.08730189^2742 g&grams                                     Weight % 01/25/2018 52^52% %&percent CHET 01/25/2018 15.98^16 cm&centimeters                    BPP 01/25/2018 81^06 [n/a]&Not applicable   Cervical Length (mm) 11/17/2015 35.000           Electronically signed by Chele Pagan MD on 01/30/2018 at 8:28 PM    ________________________________________________________________________  Date of Visit Update:  Clearance Audra was seen and examined. History and physical has been reviewed. The patient has been examined.  There have been no significant clinical changes since the completion of the originally dated History and Physical.    Signed By: Chele Pagan MD     January 30, 2018 8:29 PM

## 2018-01-31 NOTE — PROGRESS NOTES
Renal US- moderated bilateral hydronephrosis- unchanged from prior renal US 2018, no stones  Patient still having some back pain  A&P  36 3/7 with UTI/possible pyelo and urinary retention  -will remove Connors now and do voiding trial  -continue Vancomycin- if stable may be able to change to po tomorrow- urine culture pending

## 2018-01-31 NOTE — PROGRESS NOTES
C/o ongoing back pain. Unable to void. Bladder scan: 664mL    Visit Vitals    /75 (BP 1 Location: Left arm, BP Patient Position: Sitting)    Pulse 81    Temp 98.1 °F (36.7 °C)    Resp 18    Ht 5' 3\" (1.6 m)    Wt 82.8 kg (182 lb 8.7 oz)    SpO2 97%    BMI 32.34 kg/m2     Recent Results (from the past 12 hour(s))   URINALYSIS W/ REFLEX CULTURE    Collection Time: 01/30/18  7:55 PM   Result Value Ref Range    Color DARK YELLOW      Appearance TURBID (A) CLEAR      Specific gravity 1.019 1.003 - 1.030      pH (UA) 7.0 5.0 - 8.0      Protein NEGATIVE  NEG mg/dL    Glucose NEGATIVE  NEG mg/dL    Ketone NEGATIVE  NEG mg/dL    Bilirubin NEGATIVE  NEG      Blood NEGATIVE  NEG      Urobilinogen 1.0 0.2 - 1.0 EU/dL    Nitrites POSITIVE (A) NEG      Leukocyte Esterase MODERATE (A) NEG      WBC >100 (H) 0 - 4 /hpf    RBC 0-5 0 - 5 /hpf    Epithelial cells FEW FEW /lpf    Bacteria 4+ (A) NEG /hpf    UA:UC IF INDICATED URINE CULTURE ORDERED (A) CNI       A/P: 32 y/o [id number] @ 36/2 weeks    UTI & urinary retention - recent UCx (Staph epidermidis & Strep viridans) sensitive to vancomycin, afebrile/no CVA tenderness.  Admit for IV hydration, IV abx (vancomycin), pain control, prn straight cath  Back pain, h/o B/L hydronephrosis - ?ascending infection, consider renal US

## 2018-01-31 NOTE — PROGRESS NOTES
2230-Bedside report from Alisson Mueller, care assumed at this time    2310-Spoke with Dr. Miranda Number on the phone, states to give patient 1 hour from pyridium to urinate, if patient is unable to void, will place garber     0500-Bedside report to KIT Wright RN, care turned over at this time

## 2018-01-31 NOTE — PROGRESS NOTES
Ante Partum Progress Note    Sukh Mora  [id number]    Assessment: 36w3d   Urinary tract infection-she has been on daily Macrobid for suppression due to h/o pyelonephritis, urine culture  showed Staph epidermidis but office was unable to reach her to have her increase Macrobid to treatment dose. Started on Vancomycin here. Urinary retention- garber placed last night  Back pain- h/o hydronephrosis    Plan:  Continue hospitalization    Continue Vancomycin for UTI/possible pyelonephritis- culture pending   Will evaluate with renal US given h/o hydronephrosis   Will leave Garber catheter in for now given urinary retention    Orders/Charges: Medium    Patient states she continues to have right flank pain. No fever or chills. No contractions, VB, LOF. Active FM. Vitals:  Visit Vitals    /54 (BP 1 Location: Left arm, BP Patient Position: At rest;Lying left side)    Pulse 77    Temp 98.3 °F (36.8 °C)    Resp 18    Ht 5' 3\" (1.6 m)    Wt 82.8 kg (182 lb 8.7 oz)    SpO2 97%    Breastfeeding Yes    BMI 32.34 kg/m2     Temp (24hrs), Av.1 °F (36.7 °C), Min:97.7 °F (36.5 °C), Max:98.3 °F (36.8 °C)      Last 24hr Input/Output:    Intake/Output Summary (Last 24 hours) at 18 0938  Last data filed at 18 0715   Gross per 24 hour   Intake           653.75 ml   Output             1500 ml   Net          -846.25 ml        Non stress test:  Reactive    No data found. No data found. Uterine Activity: None     Exam:  Patient without distress.      Abdomen, fundus soft non-tender     Extremities, no redness or tenderness               Additional Exam: Deferred    Labs:     Lab Results   Component Value Date/Time    WBC 9.0 2018 10:38 PM    WBC 16.9 2017 05:00 PM    WBC 8.4 10/05/2017 09:00 PM    WBC 8.4 2017 05:54 PM    WBC 9.9 2016 09:29 PM    WBC 10.3 2016 01:26 AM    WBC 5.6 2016 02:32 PM    WBC 8.0 2015 09:11 PM    WBC 6.8 2015 03:44 AM    WBC 9.6 04/08/2015 05:45 PM    WBC 7.4 04/06/2015 06:20 PM    WBC 10.6 04/05/2015 06:45 PM    WBC 8.5 01/05/2015 11:40 AM    WBC 9.9 11/07/2014 09:45 PM    WBC 6.3 09/08/2014 01:35 AM    WBC 9.9 07/23/2014 04:50 PM    WBC 9.6 05/07/2014 05:55 AM    WBC 12.7 10/26/2010 06:00 PM    WBC 12.6 05/27/2010 03:54 AM    HGB 10.0 01/30/2018 10:38 PM    HGB 10.7 11/26/2017 05:00 PM    HGB 11.0 10/05/2017 09:00 PM    HGB 11.1 09/20/2017 05:54 PM    HGB 11.8 05/12/2016 09:29 PM    HGB 13.2 05/03/2016 01:26 AM    HGB 13.0 04/19/2016 02:32 PM    HGB 12.3 11/06/2015 09:11 PM    HGB 10.6 05/04/2015 03:44 AM    HGB 9.6 04/08/2015 05:45 PM    HGB 9.2 04/06/2015 06:20 PM    HGB 10.1 04/05/2015 06:45 PM    HGB 9.1 01/05/2015 11:40 AM    HGB 10.7 11/07/2014 09:45 PM    HGB 11.5 09/08/2014 01:35 AM    HGB 13.3 07/23/2014 04:50 PM    HGB 13.8 05/07/2014 05:55 AM    HGB 10.0 10/26/2010 06:00 PM    HGB 11.4 05/27/2010 03:54 AM    HCT 29.7 01/30/2018 10:38 PM    HCT 30.4 11/26/2017 05:00 PM    HCT 31.7 10/05/2017 09:00 PM    HCT 32.1 09/20/2017 05:54 PM    HCT 35.0 05/12/2016 09:29 PM    HCT 38.6 05/03/2016 01:26 AM    HCT 39.0 04/19/2016 02:32 PM    HCT 37.8 11/06/2015 09:11 PM    HCT 32.2 05/04/2015 03:44 AM    HCT 29.2 04/08/2015 05:45 PM    HCT 27.2 04/06/2015 06:20 PM    HCT 30.7 04/05/2015 06:45 PM    HCT 27.8 01/05/2015 11:40 AM    HCT 31.1 11/07/2014 09:45 PM    HCT 33.9 09/08/2014 01:35 AM    HCT 40.4 07/23/2014 04:50 PM    HCT 41.2 05/07/2014 05:55 AM    HCT 30.4 10/26/2010 06:00 PM    HCT 33.5 05/27/2010 03:54 AM    PLATELET 382 83/51/3179 10:38 PM    PLATELET 967 18/64/4425 05:00 PM    PLATELET 744 76/40/4183 09:00 PM    PLATELET 800 80/28/2184 05:54 PM    PLATELET 973 39/54/3318 09:29 PM    PLATELET 357 29/30/5809 01:26 AM    PLATELET 604 72/56/9250 02:32 PM    PLATELET 507 23/28/6187 09:11 PM    PLATELET 663 06/41/5167 03:44 AM    PLATELET 359 66/69/0054 05:45 PM    PLATELET 321 89/58/4613 06:20 PM    PLATELET 959 06/13/9142 06:45 PM    PLATELET 022 17/08/7997 11:40 AM    PLATELET 049 55/63/9997 09:45 PM    PLATELET 110 53/86/3215 01:35 AM    PLATELET 081 19/45/8112 04:50 PM    PLATELET 628 60/39/6883 05:55 AM    PLATELET 066 03/36/0855 06:00 PM    PLATELET 478 77/74/3316 03:54 AM       Recent Results (from the past 24 hour(s))   URINALYSIS W/ REFLEX CULTURE    Collection Time: 01/30/18  7:55 PM   Result Value Ref Range    Color DARK YELLOW      Appearance TURBID (A) CLEAR      Specific gravity 1.019 1.003 - 1.030      pH (UA) 7.0 5.0 - 8.0      Protein NEGATIVE  NEG mg/dL    Glucose NEGATIVE  NEG mg/dL    Ketone NEGATIVE  NEG mg/dL    Bilirubin NEGATIVE  NEG      Blood NEGATIVE  NEG      Urobilinogen 1.0 0.2 - 1.0 EU/dL    Nitrites POSITIVE (A) NEG      Leukocyte Esterase MODERATE (A) NEG      WBC >100 (H) 0 - 4 /hpf    RBC 0-5 0 - 5 /hpf    Epithelial cells FEW FEW /lpf    Bacteria 4+ (A) NEG /hpf    UA:UC IF INDICATED URINE CULTURE ORDERED (A) CNI     CBC W/O DIFF    Collection Time: 01/30/18 10:38 PM   Result Value Ref Range    WBC 9.0 3.6 - 11.0 K/uL    RBC 3.35 (L) 3.80 - 5.20 M/uL    HGB 10.0 (L) 11.5 - 16.0 g/dL    HCT 29.7 (L) 35.0 - 47.0 %    MCV 88.7 80.0 - 99.0 FL    MCH 29.9 26.0 - 34.0 PG    MCHC 33.7 30.0 - 36.5 g/dL    RDW 14.1 11.5 - 14.5 %    PLATELET 677 335 - 928 K/uL    MPV 9.9 8.9 - 12.9 FL    NRBC 0.0 0  WBC    ABSOLUTE NRBC 0.00 0.00 - 0.01 K/uL

## 2018-01-31 NOTE — ED NOTES
7:07 PM         HPI    33 y.o. approx 39 week pregnant female with a PMHx of [id number], hydronephrosis, pyelonephritis, CKD presents ambulatory to ED with a c/o right lower back pain and pressure and inability to urinate. Pt reports she lost her mucus plug today. She denies any vaginal bleeding or loss of fluids. Fetal movement in 24 hrs?: yes. Pt denies any N/V/D. She states her last BM was two days ago. Pt states she is taking prenatal vitamins. She states being on Macrobid for suppression for h/o pyelonephritis. Pt has had no relief from Tylenol. Active Hospital Problems    Diagnosis Date Noted    Flank pain 2018    Abdominal pressure 2018    UTI in pregnancy, antepartum, third trimester 2018     Past Medical History:   Diagnosis Date    Abnormal Pap smear     chlamydia at beginning of preg; treated at that time    Chronic kidney disease     She thinks she has CKD or \"small kidneys\"     HX OTHER MEDICAL      x2    Pyelonephritis 2015    Rhesus isoimmunization unspecified as to episode of care in pregnancy     o negative     Past Surgical History:   Procedure Laterality Date    HX APPENDECTOMY      HX CHOLECYSTECTOMY       Social History     Social History    Marital status: LEGALLY      Spouse name: N/A    Number of children: N/A    Years of education: N/A     Occupational History    Not on file. Social History Main Topics    Smoking status: Current Every Day Smoker     Packs/day: 0.25     Years: 2.00    Smokeless tobacco: Never Used      Comment: nicotine patch, education on smoking and dental problems    Alcohol use No      Comment: occ    Drug use: No    Sexual activity: Yes     Partners: Male     Birth control/ protection: Inserts, Pill     Other Topics Concern    Not on file     Social History Narrative       Is blood pressure high?: no  Swelling in legs?: no    There are no other sxs or complaints noted at this time.      ROS:  1) Positive for back pain. 2) Positive for inability to urinate  3) Negative for vaginal bleeding  4) Negative for loss of fluids. All other Systems Negative    Patient Vitals for the past 12 hrs:   Temp Pulse Resp BP SpO2   01/30/18 2300 98.1 °F (36.7 °C) 77 18 106/55 97 %   01/30/18 1940 98.1 °F (36.7 °C) 81 18 - 97 %   01/30/18 1910 97.7 °F (36.5 °C) 85 20 120/75 100 %       PE:  General appearance - well nourished, well appearing, and in no distress  Eyes - pupils equal and reactive, extraocular eye movements intact  ENT - mucous membranes moist, pharynx normal without lesions  Neck - supple, no significant adenopathy;   Chest - clear to auscultation, no wheezes, rales or rhonchi; non-tender to palpation  Heart - normal rate and regular rhythm, S1 and S2 normal, no murmurs noted  Abdomen - soft, nontender, nondistended, TTP right lower back, no CVAT, no midline tenderness, gravid uterus  Musculoskeletal - no joint tenderness, deformity or swelling; normal ROM  Extremities - peripheral pulses normal, no pedal edema  Skin - normal coloration and turgor, no rashes  Neurological - alert, oriented x3, normal speech, no focal findings or movement disorder noted    DDx: UTI, pyelonephritis, renal colic, discomfort in pregnancy     33 y.o. approx 39 week pregnant female with a PMHx of [id number], hydronephrosis, pyelonephritis, CKD presents ambulatory to ED with a c/o right lower back pain and pressure and inability to urinate. Likely UTI, no evidence of pyelonephritis. Afebrile with stable vitals; do not suspect precipitous delivery. Pt safe for dc from ED to L&D. Impression:    ICD-10-CM ICD-9-CM   1. Acute flank pain R10.9 789.09     338.19   2. Decreased urine output R34 788.5   3. 36 weeks gestation of pregnancy Z3A.36 V22.2       PLAN:  1. Sent to Labor and Delivery. Attestation:   This note is prepared by Duane Orange, acting as Scribe for Sumeet Davis MD.    The scribe's documentation has been prepared under my direction and personally reviewed by me in its entirety. I confirm that the note above accurately reflects all work, treatment, procedures, and medical decision making performed by me.   Juana Ruiz MD

## 2018-01-31 NOTE — PROGRESS NOTES
Dr. Lori Dowd at bedside and SVE done. Pt 1/50/high. Awaiting urine results. Order to start IV and give bolus of NS.

## 2018-02-01 LAB
BACTERIA SPEC CULT: ABNORMAL
CC UR VC: ABNORMAL
SERVICE CMNT-IMP: ABNORMAL

## 2018-02-01 PROCEDURE — 96361 HYDRATE IV INFUSION ADD-ON: CPT

## 2018-02-01 PROCEDURE — 74011250636 HC RX REV CODE- 250/636: Performed by: OBSTETRICS & GYNECOLOGY

## 2018-02-01 PROCEDURE — 96367 TX/PROPH/DG ADDL SEQ IV INF: CPT

## 2018-02-01 PROCEDURE — 77030011943

## 2018-02-01 PROCEDURE — 74011250637 HC RX REV CODE- 250/637: Performed by: OBSTETRICS & GYNECOLOGY

## 2018-02-01 PROCEDURE — 51798 US URINE CAPACITY MEASURE: CPT

## 2018-02-01 PROCEDURE — 65410000002 HC RM PRIVATE OB

## 2018-02-01 PROCEDURE — 59025 FETAL NON-STRESS TEST: CPT

## 2018-02-01 RX ORDER — NITROFURANTOIN 25; 75 MG/1; MG/1
100 CAPSULE ORAL EVERY 12 HOURS
Status: DISCONTINUED | OUTPATIENT
Start: 2018-02-01 | End: 2018-02-07 | Stop reason: HOSPADM

## 2018-02-01 RX ORDER — OXYCODONE AND ACETAMINOPHEN 5; 325 MG/1; MG/1
2 TABLET ORAL
Status: DISCONTINUED | OUTPATIENT
Start: 2018-02-01 | End: 2018-02-03

## 2018-02-01 RX ORDER — TAMSULOSIN HYDROCHLORIDE 0.4 MG/1
0.4 CAPSULE ORAL
Status: DISCONTINUED | OUTPATIENT
Start: 2018-02-01 | End: 2018-02-03

## 2018-02-01 RX ADMIN — VANCOMYCIN HYDROCHLORIDE 1250 MG: 10 INJECTION, POWDER, LYOPHILIZED, FOR SOLUTION INTRAVENOUS at 10:51

## 2018-02-01 RX ADMIN — OXYCODONE HYDROCHLORIDE AND ACETAMINOPHEN 1 TABLET: 5; 325 TABLET ORAL at 07:24

## 2018-02-01 RX ADMIN — OXYCODONE HYDROCHLORIDE AND ACETAMINOPHEN 2 TABLET: 5; 325 TABLET ORAL at 16:34

## 2018-02-01 RX ADMIN — SODIUM CHLORIDE, POTASSIUM CHLORIDE, SODIUM LACTATE AND CALCIUM CHLORIDE 500 ML: 600; 310; 30; 20 INJECTION, SOLUTION INTRAVENOUS at 06:55

## 2018-02-01 RX ADMIN — ZOLPIDEM TARTRATE 5 MG: 5 TABLET ORAL at 21:38

## 2018-02-01 RX ADMIN — PHENAZOPYRIDINE HYDROCHLORIDE 200 MG: 100 TABLET ORAL at 13:00

## 2018-02-01 RX ADMIN — NITROFURANTOIN (MONOHYDRATE/MACROCRYSTALS) 100 MG: 75; 25 CAPSULE ORAL at 13:00

## 2018-02-01 RX ADMIN — TAMSULOSIN HYDROCHLORIDE 0.4 MG: 0.4 CAPSULE ORAL at 21:37

## 2018-02-01 RX ADMIN — PHENAZOPYRIDINE HYDROCHLORIDE 200 MG: 100 TABLET ORAL at 10:51

## 2018-02-01 RX ADMIN — Medication 10 ML: at 14:30

## 2018-02-01 RX ADMIN — DOCUSATE SODIUM 100 MG: 100 CAPSULE, LIQUID FILLED ORAL at 21:38

## 2018-02-01 RX ADMIN — OXYCODONE HYDROCHLORIDE AND ACETAMINOPHEN 1 TABLET: 5; 325 TABLET ORAL at 02:03

## 2018-02-01 RX ADMIN — OXYCODONE HYDROCHLORIDE AND ACETAMINOPHEN 2 TABLET: 5; 325 TABLET ORAL at 11:28

## 2018-02-01 RX ADMIN — PHENAZOPYRIDINE HYDROCHLORIDE 200 MG: 100 TABLET ORAL at 18:33

## 2018-02-01 RX ADMIN — SODIUM CHLORIDE, SODIUM LACTATE, POTASSIUM CHLORIDE, AND CALCIUM CHLORIDE 75 ML/HR: 600; 310; 30; 20 INJECTION, SOLUTION INTRAVENOUS at 17:53

## 2018-02-01 RX ADMIN — OXYCODONE HYDROCHLORIDE AND ACETAMINOPHEN 2 TABLET: 5; 325 TABLET ORAL at 20:36

## 2018-02-01 RX ADMIN — Medication 1 TABLET: at 08:54

## 2018-02-01 NOTE — PROGRESS NOTES
Pharmacy Automatic Renal Dosing Protocol - Antimicrobials    Indication for Antimicrobials: UTI in 39 week pregnant patient    Current Regimen of Each Antimicrobial:  Nitrofurantoin 100 mg po BID (category B in pregnancy)      Significant Cultures:   18 Urine culture = Greater than 100K CFU/mL apparent staph species (Results pending)-MRSE  Per MD note, patient had outpatient urine culture on 18 with staph epidermidis (FINAL)    Paralysis, amputations, malnutrition: N/A    Labs:  Recent Labs      18   2238   WBC  9.0     Temp (24hrs), Av.2 °F (36.8 °C), Min:97.9 °F (36.6 °C), Max:98.6 °F (37 °C)    Creatinine Clearance (mL/min) or Dialysis: Greater than 90 mL/min    No results found for: PCT    Impression/Plan:   Nitrofurantoin dose is appropriate for renal function and indication     Pharmacy will follow daily and adjust medications as appropriate for renal function and/or serum levels. Thank you,  Haydee Benedict, PHARMD    Recommended duration of therapy  [email or web address]/[email or web address]. docx    Renal Dosing  [email or web address]/Pharmacy/Clinical%20Companion/Renal%[email or web address]. pdf

## 2018-02-01 NOTE — PROGRESS NOTES
Ante Partum Progress Note    Maira Stone  89M5C    Assessment: 48Y2D   Complicated UTI and urinary retention/obstruction. Plan:    -Continue mIVFs  -Continue macrobid  -Continue garber catheter for management of obstruction  -Start flomax  -Voiding trial in am  -If fails voiding trial, will need to decide on timing of delivery. 37 weeks seems reasonable, but may want to confirm with MFM. -Baby is cephalic, 87% on 4/08.   -Unable to collect GBS due to multiple antibiotics already received. Orders/Charges: Medium    Patient states she had continued lower back pain. +FM. No CTX, VB, LOF. Patient had renal US yesterday that showed moderate bilateral hydronephrosis, unchanged from earlier in the pregnancy. Garber was removed last night and patient did not void until this morning, only 75mL despite IVFs all night. She was straight cathed for an additional 75 mL. By mid-day, she still had not voided again. Bladder scan showed ~500 mL. A garber was replaced and 800 mL diuresed immediately. Reviewed patient's history with her and in chart from prior delivery in 2015. She had a similar situation in which she had urinary obstruction that led to worsening hydronephrosis. She saw [facility] inpatient and was offered stents vs. Delivery to relieve the obstruction. She chose delivery, with IOL at 37 weeks. Was cared for by Campbell Singleton that pregnancy. Today, discussed with Dr. Manuel Ambrocio with [facility]. He reviewed her history and recommends garber overnight with flomax 0.4 mg tonight. He recommends voiding trial tomorrow morning. If fails, would likely need catheter until delivery. Also reviewed urine cultures and sensitivities. He recommends d/cing vancomycin (never febrile, normal WBC, on for 24 hrs already) and switch to macrobid 100 mg BID since UTI is resistant to PCNs and cephalosporins.      Vitals:  Visit Vitals    /65    Pulse 72    Temp 98 °F (36.7 °C)    Resp 16    Ht 5' 3\" (1.6 m)    Wt 82.8 kg (182 lb 8.7 oz)    SpO2 97%    Breastfeeding Yes    BMI 32.34 kg/m2     Temp (24hrs), Av.1 °F (36.7 °C), Min:97.9 °F (36.6 °C), Max:98.6 °F (37 °C)      Last 24hr Input/Output:    Intake/Output Summary (Last 24 hours) at 18 1732  Last data filed at 18 1330   Gross per 24 hour   Intake           2692.5 ml   Output             1500 ml   Net           1192.5 ml          Patient Vitals for the past 4 hrs: Mode Fetal Heart Rate   18 1530 External 125    Patient Vitals for the past 4 hrs: Mode Fetal Heart Rate   18 1530 External 125        Exam:  Patient without distress. Abdomen, fundus soft non-tender. Back without CVA tenderness noted. Extremities, no redness or tenderness              Labs:   No results found for this or any previous visit (from the past 24 hour(s)).

## 2018-02-02 PROCEDURE — 65410000002 HC RM PRIVATE OB

## 2018-02-02 PROCEDURE — 87081 CULTURE SCREEN ONLY: CPT | Performed by: OBSTETRICS & GYNECOLOGY

## 2018-02-02 PROCEDURE — 74011250637 HC RX REV CODE- 250/637: Performed by: OBSTETRICS & GYNECOLOGY

## 2018-02-02 PROCEDURE — 74011250636 HC RX REV CODE- 250/636: Performed by: OBSTETRICS & GYNECOLOGY

## 2018-02-02 PROCEDURE — 77030034849

## 2018-02-02 RX ORDER — HYDROMORPHONE HYDROCHLORIDE 2 MG/ML
0.5 INJECTION, SOLUTION INTRAMUSCULAR; INTRAVENOUS; SUBCUTANEOUS
Status: DISCONTINUED | OUTPATIENT
Start: 2018-02-02 | End: 2018-02-03

## 2018-02-02 RX ADMIN — HYDROMORPHONE HYDROCHLORIDE 0.5 MG: 2 INJECTION, SOLUTION INTRAMUSCULAR; INTRAVENOUS; SUBCUTANEOUS at 19:15

## 2018-02-02 RX ADMIN — PHENAZOPYRIDINE HYDROCHLORIDE 200 MG: 100 TABLET ORAL at 09:19

## 2018-02-02 RX ADMIN — PHENAZOPYRIDINE HYDROCHLORIDE 200 MG: 100 TABLET ORAL at 14:15

## 2018-02-02 RX ADMIN — OXYCODONE HYDROCHLORIDE AND ACETAMINOPHEN 2 TABLET: 5; 325 TABLET ORAL at 04:27

## 2018-02-02 RX ADMIN — TAMSULOSIN HYDROCHLORIDE 0.4 MG: 0.4 CAPSULE ORAL at 22:28

## 2018-02-02 RX ADMIN — NITROFURANTOIN (MONOHYDRATE/MACROCRYSTALS) 100 MG: 75; 25 CAPSULE ORAL at 09:12

## 2018-02-02 RX ADMIN — PHENAZOPYRIDINE HYDROCHLORIDE 200 MG: 100 TABLET ORAL at 17:19

## 2018-02-02 RX ADMIN — SODIUM CHLORIDE, SODIUM LACTATE, POTASSIUM CHLORIDE, AND CALCIUM CHLORIDE 75 ML/HR: 600; 310; 30; 20 INJECTION, SOLUTION INTRAVENOUS at 20:46

## 2018-02-02 RX ADMIN — Medication 1 TABLET: at 09:12

## 2018-02-02 RX ADMIN — NITROFURANTOIN (MONOHYDRATE/MACROCRYSTALS) 100 MG: 75; 25 CAPSULE ORAL at 21:16

## 2018-02-02 RX ADMIN — DOCUSATE SODIUM 100 MG: 100 CAPSULE, LIQUID FILLED ORAL at 17:19

## 2018-02-02 RX ADMIN — NITROFURANTOIN (MONOHYDRATE/MACROCRYSTALS) 100 MG: 75; 25 CAPSULE ORAL at 00:29

## 2018-02-02 RX ADMIN — ZOLPIDEM TARTRATE 5 MG: 5 TABLET ORAL at 21:16

## 2018-02-02 RX ADMIN — OXYCODONE HYDROCHLORIDE AND ACETAMINOPHEN 2 TABLET: 5; 325 TABLET ORAL at 13:33

## 2018-02-02 RX ADMIN — SODIUM CHLORIDE, SODIUM LACTATE, POTASSIUM CHLORIDE, AND CALCIUM CHLORIDE 75 ML/HR: 600; 310; 30; 20 INJECTION, SOLUTION INTRAVENOUS at 06:35

## 2018-02-02 RX ADMIN — OXYCODONE HYDROCHLORIDE AND ACETAMINOPHEN 2 TABLET: 5; 325 TABLET ORAL at 09:12

## 2018-02-02 NOTE — CONSULTS
Requesting Provider: Pam Waite MD  Reason for Consultation: retention, uti  Pre-existing [facility] Patient:   No                Patient: Dillan Short MRN: [id number]  SSN: [id number]    YOB: 1990  Age: 33 y.o. Sex: female     Location: 3182       Code Status: Full Code   PCP: PROVIDER UNKNOWN  - None   Emergency Contact:  Primary Emergency Contact: Teresa Rose, Home Phone: [phone]   Race/Gnosticist/Language: WHITE OR Lily Sahu / Adenike Monteiro / Esau Becerra   Payor: Payor: Jie Paiz / Plan: Lee Spurling / Product Type: Managed Care Medicaid /    Prior Admission Data: [facility] EMERGENCY DEPT     Hospitalized:  Hospital Day: 4 - Admitted 2018  7:14 PM   POD # * No surgery found *  by * Surgery not found * - Blood Loss: * No surgery found * * Surgery not found *     CONSULTANTS  IP CONSULT TO UROLOGY   ADMISSION DIAGNOSES    ICD-10-CM ICD-9-CM   1. Acute flank pain R10.9 789.09     338.19   2. Decreased urine output R34 788.5   3. 36 weeks gestation of pregnancy Z3A.36 V22.2         Assessment/Plan:     · Uti, continue macrobid until cath removed  ·  retention, replace cath if pt unable to void or if pvr > 400  · Flank pain physiologic, doubt pyelo, hopefully will improve post delivery  ·  discussed with ob, above likely to resolve with delivery. CC: Pregnancy (36 weeks pregnant & having pain in lower abd & back & not urinating much now. also notes lost mucus plug a few days ago & Dr is Gonzalez Tapia)   HPI: She is a 33 y.o. female , 36 wks pregnant with retention/voiding dysfunction, uti, physiologic hydro, normal creatinine. Back pain occurred with previous pregnancies. Went into retention (800 pvr). Void trial done at 1 today, has \"dribbled a little\". No chills. Asked to see for above.     Temp (24hrs), Av.1 °F (36.7 °C), Min:97.8 °F (36.6 °C), Max:98.4 °F (36.9 °C)    Creatinine   Date/Time Value Ref Range Status   2017 05:00 PM 0.54 (L) 0.55 - 1.02 MG/DL Final   10/05/2017 09:00 PM 0.47 (L) 0.55 - 1.02 MG/DL Final   09/23/2017 02:14 AM 0.42 (L) 0.55 - 1.02 MG/DL Final   09/20/2017 05:54 PM 0.44 (L) 0.55 - 1.02 MG/DL Final   05/03/2016 01:26 AM 0.59 0.55 - 1.02 MG/DL Final     Current Antimicrobial Therapy (168h ago through future)    Ordered     Start Stop    02/01/18 1214  nitrofurantoin (macrocrystal-monohydrate) (MACROBID) capsule 100 mg  100 mg,   Oral,   EVERY 12 HOURS     Comments:  Nitrofurantoin is contraindicated in patients with a CRCL less than 60mL/min or clinically elevated serum creatinine. 02/01/18 1300 --        Diet: DIET REGULAR -    Urinary Status: Connors     Labs     Lab Results   Component Value Date/Time    WBC 9.0 01/30/2018 10:38 PM    HCT 29.7 01/30/2018 10:38 PM    PLATELET 514 90/21/2225 10:38 PM    Sodium 135 11/26/2017 05:00 PM    Potassium 3.4 11/26/2017 05:00 PM    Chloride 101 11/26/2017 05:00 PM    CO2 24 11/26/2017 05:00 PM    BUN 7 11/26/2017 05:00 PM    Creatinine 0.54 11/26/2017 05:00 PM    Glucose 111 11/26/2017 05:00 PM    Calcium 9.0 11/26/2017 05:00 PM     UA: Lab Results   Component Value Date/Time    Color DARK YELLOW 01/30/2018 07:55 PM    Appearance TURBID 01/30/2018 07:55 PM    Specific gravity 1.019 01/30/2018 07:55 PM    Specific gravity 1.010 11/26/2017 04:46 PM    pH (UA) 7.0 01/30/2018 07:55 PM    Protein NEGATIVE  01/30/2018 07:55 PM    Glucose NEGATIVE  01/30/2018 07:55 PM    Ketone NEGATIVE  01/30/2018 07:55 PM    Bilirubin NEGATIVE  01/30/2018 07:55 PM    Urobilinogen 1.0 01/30/2018 07:55 PM    Nitrites POSITIVE 01/30/2018 07:55 PM    Leukocyte Esterase MODERATE 01/30/2018 07:55 PM    Epithelial cells FEW 01/30/2018 07:55 PM    Bacteria 4+ 01/30/2018 07:55 PM    WBC >100 01/30/2018 07:55 PM    RBC 0-5 01/30/2018 07:55 PM     Imaging     Results for orders placed during the hospital encounter of 04/19/16   CT ABD PELV WO CONT    Narrative **Final Report**       ICD Codes / Adm. Diagnosis: 572741  873.63 / Flank Pain  Flank Pain; Urinary   Pain  Examination:  CT ABD PELVIS WO CON  - [id number] - Apr 19 2016  3:49PM  Accession No:  [id number]  Reason:  right flank pain      REPORT:  EXAM:  CT ABD PELVIS WO CON    INDICATION:  right flank pain, 2 day history, oliguria    COMPARISON: CT of 7/23/2014. TECHNIQUE:   Unenhanced multislice helical CT was performed from the diaphragm to the   symphysis pubis without oral or intravenous contrast administration. Contiguous 5 mm axial images were reconstructed and lung and soft tissue   windows were generated. Coronal and sagittal reformatted images were   generated. FINDINGS:  The visualized portions of the lung bases are clear. The absence of intravenous contrast material reduces the sensitivity for   evaluation of the solid parenchymal organs of the abdomen. No focal   abnormalities are seen within the liver, spleen, pancreas or adrenal glands   . There are no solid renal masses or hydronephrosis. No renal or ureteral   calculus or obstruction is identified. The gallbladder is surgically absent. .  There is no retroperitoneal adenopathy or mass. The bowel is not obstructed. The appendix is surgically absent. There is no   ascites or free intraperitoneal air. The uterus and adnexal regions are unremarkable. There is no pelvic mass   or adenopathy. There are no significant bony abnormalities. IMPRESSION:   No urinary tract calculi or hydronephrosis. No acute findings in the abdomen   or pelvis. Prior cholecystectomy and appendectomy.               Signing/Reading Doctor: Julieta Lemus ([id number])    Approved: Julieta Lemus ([id number])  Apr 19 2016  4:02PM                                   US Results (most recent):    Results from [place] encounter on 01/30/18   US RETROPERITONEUM COMP   Narrative EXAM:  US RETROPERITONEUM COMP    INDICATION:  Pyelonephritis, uncomplicated    COMPARISON: Ultrasound 9/23/2017 and CT 4/19/2016. TECHNIQUE:  Real-time sonography of the kidneys, retroperitoneum and bladder was  performed with multiple static images obtained. FINDINGS:  There is no significant change in moderate bilateral hydronephrosis compared to  prior ultrasound. The kidneys are normal in size and echogenicity with no stone  or mass identified. The right kidney measures 13.5 cm and the left kidney  measures 13.0 cm. The aorta partially obscured in the mid and distal portion with the proximal  portion unremarkable  The proximal iliac arteries are obscured by overlying  bowel gas/body habitus. The IVC is appears normal. No retroperitoneal mass is  identified. The urinary bladder is collapsed around a Connors catheter balloon         Impression IMPRESSION: No significant change in moderate bilateral hydronephrosis.         Cultures     All Micro Results     Procedure Component Value Units Date/Time    CULTURE, URINE [[id number]]  (Abnormal)  (Susceptibility) Collected:  01/30/18 1955    Order Status:  Completed Specimen:  Urine Updated:  02/01/18 1207     Special Requests: --        NO SPECIAL REQUESTS  Reflexed from [id number]       Curlew Count --        >100,000  COLONIES/mL       Culture result:         STAPHYLOCOCCUS EPIDERMIDIS (OXACILLIN RESISTANT) (A)           Past History: (Complete 2+/3 areas)     Allergies   Allergen Reactions    Morphine Other (comments)     Causes muscle aches    Nsaids (Non-Steroidal Anti-Inflammatory Drug) Other (comments)     Pt reports kidney issues    Tramadol Seizures      Current Facility-Administered Medications   Medication Dose Route Frequency    oxyCODONE-acetaminophen (PERCOCET) 5-325 mg per tablet 2 Tab  2 Tab Oral Q4H PRN    nitrofurantoin (macrocrystal-monohydrate) (MACROBID) capsule 100 mg  100 mg Oral Q12H    tamsulosin (FLOMAX) capsule 0.4 mg  0.4 mg Oral QHS    phenazopyridine (PYRIDIUM) tablet 200 mg  200 mg Oral TIDPC    sodium chloride (NS) flush 5-10 mL  5-10 mL IntraVENous Q8H    sodium chloride (NS) flush 5-10 mL  5-10 mL IntraVENous PRN    prenatal vit-iron fumarate-fa (PRENATAL PLUS with IRON) tablet 1 Tab  1 Tab Oral DAILY    lactated Ringers infusion  75 mL/hr IntraVENous CONTINUOUS    ondansetron (ZOFRAN ODT) tablet 4 mg  4 mg Oral Q8H PRN    diphenhydrAMINE (BENADRYL) capsule 25 mg  25 mg Oral Q4H PRN    zolpidem (AMBIEN) tablet 5 mg  5 mg Oral QHS PRN    docusate sodium (COLACE) capsule 100 mg  100 mg Oral BID PRN    famotidine (PEPCID) tablet 20 mg  20 mg Oral Q12H PRN    Prior to Admission medications    Medication Sig Start Date End Date Taking? Authorizing Provider   PRENATAL VIT CALC,IRON,FOLIC (PRENATAL VITAMIN PO) Take  by mouth. Yes Nicole Carty MD   ondansetron (ZOFRAN ODT) 4 mg disintegrating tablet Take 1 Tab by mouth every eight (8) hours as needed for Nausea. 9/20/17  Yes Raymundo Pagan MD   cephALEXin (KEFLEX) 500 mg capsule Take 1 Cap by mouth three (3) times daily. 11/26/17   Severiano Heaps., MD        PMHx:  has a past medical history of Abnormal Pap smear; Chronic kidney disease; OTHER MEDICAL; Pyelonephritis (1/6/2015); and Rhesus isoimmunization unspecified as to episode of care in pregnancy. She also has no past medical history of Abnormal Papanicolaou smear of cervix; Acquired hypothyroidism; Anemia; Anemia NEC; Asthma; Asthma; Chlamydia; Complication of anesthesia; Diabetes ([facility] [address] Diabetes mellitus; Essential hypertension; Essential hypertension; Genital herpes, unspecified; Gestational diabetes; Gestational hypertension; Gonorrhea; Heart abnormalities; Heart abnormality; Herpes gestationis; Herpes simplex without mention of complication; Human immunodeficiency virus (HIV) disease; Infertility; Infertility, female; Liver disease; Nicotine vapor product user; Non-nicotine vapor product user; Phlebitis and thrombophlebitis of unspecified site; Pituitary disorder ([facility] [address] Polycystic disease, ovaries;  Postpartum depression; Psychiatric problem; Sickle cell trait syndrome ([facility] [address] Sickle-cell disease, unspecified; Syphilis; Systemic lupus erythematosus ([facility] [address] Thyroid activity decreased; Trauma; Unspecified breast disorder; Unspecified diseases of blood and blood-forming organs; or Unspecified epilepsy without mention of intractable epilepsy. PSurgHx:  has a past surgical history that includes hx cholecystectomy and hx appendectomy. PSocHx:  reports that she has been smoking. She has a 0.50 pack-year smoking history. She has never used smokeless tobacco. She reports that she does not drink alcohol or use illicit drugs.    ROS:  (Complete - 10 systems) - positives are bolded : Weightloss (Constitutional), Dry mouth (ENMT), Chest pain (CV), SOB (Respiratory), Constipation (GI), Weakness (MS), Pallor (Skin), TIA Sx (Neuro), Confusion (Psych), Easy bruising (Heme)    Physical Exam: (Comprehesive - 8+ 1995 Systems)     (1) Constitutional:  FIO2:   on SpO2: O2 Sat (%): 96 %  O2 Device: Room air    Patient Vitals for the past 24 hrs:   BP Temp Pulse Resp SpO2   02/02/18 0753 (!) 101/36 98.1 °F (36.7 °C) 72 - 96 %   02/02/18 0432 96/52 - 72 - 97 %   02/02/18 0430 97/44 - 72 - 96 %   02/02/18 0033 95/43 97.8 °F (36.6 °C) 60 18 -   02/01/18 1945 115/64 98.4 °F (36.9 °C) 62 16 -         Date 02/01/18 0700 - 02/02/18 0659 02/02/18 0700 - 02/03/18 0659   Shift 8812-9669 1997-0263 24 Hour Total 4104-1110 9547-8726 24 Hour Total   I  N  T  A  K  E   I.V.  (mL/kg/hr) 2356.3  (2.4)  2356.3  (1.2)         Volume (lactated Ringers infusion) 1856.3  1856.3         Volume (lactated ringers bolus infusion 500 mL) 500  500       Shift Total  (mL/kg) 2356.3  (28.5)  2356.3  (28.5)      O  U  T  P  U  T   Urine  (mL/kg/hr) 1350  (1.4) 350  (0.4) 1700  (0.9) 1450  1450      Urine 75  75         Urine Voided 75  75         Urine Output (mL) (Urinary Catheter 02/01/18 Connors) 9168 467 9476 1450  1450    Shift Total  (mL/kg) 1350  (16.3) 350  (4.2) 1700  (20.5) 1450  (17.5) 1450  (17.5)   NET 1006.3 -350 656.3 -1450  -1450   Weight (kg) 82.8 82.8 82.8 82.8 82.8 82.8      (2) ENMT:   moist mucous membranes   (3) Respiratory:  breathing easily   (4) GI:  Gravid ,no  tenderness, no hepatosplenomegaly, no ventral hernia, stool for occult blood not indicated as urologist. +cvat,    (5) :   normal   (6) Lymphatic:  no adenopathy   (7) Muscloskeletal:  no gross deformity   (8) Skin:  no rash   (9) Neuro:  no focal deficits      Signed By: Hayden Gutiérrez MD  - February 2, 2018

## 2018-02-02 NOTE — PROGRESS NOTES
Ante Partum Progress Note    Jaz Lopez  16R1M    Assessment: 36w5d   Urinary tract infection, persistent   Unclear if represents continued infection versus  Colonization   Prior culture results strep viridans and staph epidermis (from office testing)    Culture here + staph    S/p vancomycin IV  Remains afebrile  on PO antibiotics --macrobid BID   Urinary retention   Renal US  18   Stable hydronephrosis  Bilateral  Secondary to pregnancy    Discussed obstructive effect of gravid uterus     Similar complaints with 2 prior pregnancies (delivered by [facility])    Voiding trial today     Normal renal function  Creatinine wnl    Urology consult pending   Appreciate input   GBS collected today     Plan:  Continue hospitalization with hospitalized bedrest  Await recs from urology consult   Discussed with MFM   Continue antibiotics   If persistent obstruction may need to proceed with delivery at 37 weeks     Orders/Charges: Medium and Non Stress Test    Patient states she has no new complaints  Pelvic pressure. Flank pain   Connors draining clear     Vitals:  Visit Vitals    BP (!) 101/36 (BP 1 Location: Right arm, BP Patient Position: At rest)    Pulse 72    Temp 98.1 °F (36.7 °C)    Resp 18    Ht 5' 3\" (1.6 m)    Wt 82.8 kg (182 lb 8.7 oz)    SpO2 96%    Breastfeeding Yes    BMI 32.34 kg/m2     Temp (24hrs), Av.1 °F (36.7 °C), Min:97.8 °F (36.6 °C), Max:98.4 °F (36.9 °C)      Last 24hr Input/Output:    Intake/Output Summary (Last 24 hours) at 18 1448  Last data filed at 18 0919   Gross per 24 hour   Intake           856.25 ml   Output             2250 ml   Net         -1393.75 ml        Non stress test:  Reactive  An NST was performed and was reactive. The baseline FHR was 140. Moderate baseline  variability was noted. Accelerations of sufficient amplitude and duration were noted. There were no decelerations noted. No data found. No data found.        Uterine Activity: None     Exam: Patient without distress.      Abdomen, fundus soft non-tender     Extremities, no redness or tenderness               Additional Exam: Deferred    Labs:     Lab Results   Component Value Date/Time    WBC 9.0 01/30/2018 10:38 PM    WBC 16.9 11/26/2017 05:00 PM    WBC 8.4 10/05/2017 09:00 PM    WBC 8.4 09/20/2017 05:54 PM    WBC 9.9 05/12/2016 09:29 PM    WBC 10.3 05/03/2016 01:26 AM    WBC 5.6 04/19/2016 02:32 PM    WBC 8.0 11/06/2015 09:11 PM    WBC 6.8 05/04/2015 03:44 AM    WBC 9.6 04/08/2015 05:45 PM    WBC 7.4 04/06/2015 06:20 PM    WBC 10.6 04/05/2015 06:45 PM    WBC 8.5 01/05/2015 11:40 AM    WBC 9.9 11/07/2014 09:45 PM    WBC 6.3 09/08/2014 01:35 AM    WBC 9.9 07/23/2014 04:50 PM    WBC 9.6 05/07/2014 05:55 AM    WBC 12.7 10/26/2010 06:00 PM    WBC 12.6 05/27/2010 03:54 AM    HGB 10.0 01/30/2018 10:38 PM    HGB 10.7 11/26/2017 05:00 PM    HGB 11.0 10/05/2017 09:00 PM    HGB 11.1 09/20/2017 05:54 PM    HGB 11.8 05/12/2016 09:29 PM    HGB 13.2 05/03/2016 01:26 AM    HGB 13.0 04/19/2016 02:32 PM    HGB 12.3 11/06/2015 09:11 PM    HGB 10.6 05/04/2015 03:44 AM    HGB 9.6 04/08/2015 05:45 PM    HGB 9.2 04/06/2015 06:20 PM    HGB 10.1 04/05/2015 06:45 PM    HGB 9.1 01/05/2015 11:40 AM    HGB 10.7 11/07/2014 09:45 PM    HGB 11.5 09/08/2014 01:35 AM    HGB 13.3 07/23/2014 04:50 PM    HGB 13.8 05/07/2014 05:55 AM    HGB 10.0 10/26/2010 06:00 PM    HGB 11.4 05/27/2010 03:54 AM    HCT 29.7 01/30/2018 10:38 PM    HCT 30.4 11/26/2017 05:00 PM    HCT 31.7 10/05/2017 09:00 PM    HCT 32.1 09/20/2017 05:54 PM    HCT 35.0 05/12/2016 09:29 PM    HCT 38.6 05/03/2016 01:26 AM    HCT 39.0 04/19/2016 02:32 PM    HCT 37.8 11/06/2015 09:11 PM    HCT 32.2 05/04/2015 03:44 AM    HCT 29.2 04/08/2015 05:45 PM    HCT 27.2 04/06/2015 06:20 PM    HCT 30.7 04/05/2015 06:45 PM    HCT 27.8 01/05/2015 11:40 AM    HCT 31.1 11/07/2014 09:45 PM    HCT 33.9 09/08/2014 01:35 AM    HCT 40.4 07/23/2014 04:50 PM    HCT 41.2 05/07/2014 05:55 AM    HCT 30.4 10/26/2010 06:00 PM    HCT 33.5 05/27/2010 03:54 AM    PLATELET 212 58/98/4532 10:38 PM    PLATELET 838 32/89/6390 05:00 PM    PLATELET 985 08/36/0018 09:00 PM    PLATELET 617 52/02/0394 05:54 PM    PLATELET 624 79/63/5111 09:29 PM    PLATELET 894 85/82/3441 01:26 AM    PLATELET 157 11/79/9924 02:32 PM    PLATELET 355 76/31/8579 09:11 PM    PLATELET 912 81/87/5906 03:44 AM    PLATELET 065 53/46/1106 05:45 PM    PLATELET 390 84/91/1701 06:20 PM    PLATELET 700 71/95/3832 06:45 PM    PLATELET 700 20/77/1573 11:40 AM    PLATELET 779 96/86/3116 09:45 PM    PLATELET 904 84/13/4033 01:35 AM    PLATELET 980 92/12/9505 04:50 PM    PLATELET 393 30/17/7126 05:55 AM    PLATELET 244 69/37/2868 06:00 PM    PLATELET 221 68/80/9479 03:54 AM       No results found for this or any previous visit (from the past 24 hour(s)).

## 2018-02-03 PROCEDURE — 74011250636 HC RX REV CODE- 250/636: Performed by: OBSTETRICS & GYNECOLOGY

## 2018-02-03 PROCEDURE — 74011250637 HC RX REV CODE- 250/637: Performed by: OBSTETRICS & GYNECOLOGY

## 2018-02-03 PROCEDURE — 65410000002 HC RM PRIVATE OB

## 2018-02-03 RX ORDER — HYDROMORPHONE HYDROCHLORIDE 2 MG/1
2 TABLET ORAL
Status: DISPENSED | OUTPATIENT
Start: 2018-02-03 | End: 2018-02-05

## 2018-02-03 RX ADMIN — PHENAZOPYRIDINE HYDROCHLORIDE 200 MG: 100 TABLET ORAL at 12:48

## 2018-02-03 RX ADMIN — PHENAZOPYRIDINE HYDROCHLORIDE 200 MG: 100 TABLET ORAL at 09:50

## 2018-02-03 RX ADMIN — SODIUM CHLORIDE, SODIUM LACTATE, POTASSIUM CHLORIDE, AND CALCIUM CHLORIDE 75 ML/HR: 600; 310; 30; 20 INJECTION, SOLUTION INTRAVENOUS at 10:31

## 2018-02-03 RX ADMIN — ZOLPIDEM TARTRATE 5 MG: 5 TABLET ORAL at 21:14

## 2018-02-03 RX ADMIN — HYDROMORPHONE HYDROCHLORIDE 2 MG: 2 TABLET ORAL at 14:43

## 2018-02-03 RX ADMIN — ONDANSETRON 4 MG: 4 TABLET, ORALLY DISINTEGRATING ORAL at 12:48

## 2018-02-03 RX ADMIN — DOCUSATE SODIUM 100 MG: 100 CAPSULE, LIQUID FILLED ORAL at 10:14

## 2018-02-03 RX ADMIN — NITROFURANTOIN (MONOHYDRATE/MACROCRYSTALS) 100 MG: 75; 25 CAPSULE ORAL at 09:50

## 2018-02-03 RX ADMIN — HYDROMORPHONE HYDROCHLORIDE 0.5 MG: 2 INJECTION, SOLUTION INTRAMUSCULAR; INTRAVENOUS; SUBCUTANEOUS at 01:30

## 2018-02-03 RX ADMIN — Medication 1 TABLET: at 09:47

## 2018-02-03 RX ADMIN — PHENAZOPYRIDINE HYDROCHLORIDE 200 MG: 100 TABLET ORAL at 19:05

## 2018-02-03 RX ADMIN — HYDROMORPHONE HYDROCHLORIDE 2 MG: 2 TABLET ORAL at 23:02

## 2018-02-03 RX ADMIN — SODIUM CHLORIDE, SODIUM LACTATE, POTASSIUM CHLORIDE, AND CALCIUM CHLORIDE 75 ML/HR: 600; 310; 30; 20 INJECTION, SOLUTION INTRAVENOUS at 11:00

## 2018-02-03 RX ADMIN — HYDROMORPHONE HYDROCHLORIDE 2 MG: 2 TABLET ORAL at 19:05

## 2018-02-03 RX ADMIN — OXYCODONE HYDROCHLORIDE AND ACETAMINOPHEN 2 TABLET: 5; 325 TABLET ORAL at 10:40

## 2018-02-03 RX ADMIN — NITROFURANTOIN (MONOHYDRATE/MACROCRYSTALS) 100 MG: 75; 25 CAPSULE ORAL at 21:14

## 2018-02-03 RX ADMIN — HYDROMORPHONE HYDROCHLORIDE 0.5 MG: 2 INJECTION, SOLUTION INTRAMUSCULAR; INTRAVENOUS; SUBCUTANEOUS at 07:08

## 2018-02-03 NOTE — PROGRESS NOTES
Bedside sbar report received from [facility] [address]   106 Pt does not want to do her NST right now. She is in the rocking chair at this time. Has had a shower, bed changed and parul/garber care. Pt did not get her breakfast. Dietary called. 1015 Pt received   yogesh milk that was spoiled. Pt called to inform dietary. 1043 Pt given percocet for pain. Pt states dilaudid iv does not work as long. jmj  1442 Nurse Manager Viviana Pereirat in to see patient to discuss new visitation policy. 1500 Pt went off the floor in a wheel chair with  from [place] [id number].   1613 Pt awakened for vs. Pt refused nst at this time. Wanted to continue sleeping. 1850 Pt has used hosp heating pad today. States it helps.

## 2018-02-03 NOTE — PROGRESS NOTES
Ante Partum Progress Note    Vesna Spear  [id number]    Assessment: 36w6d   Urinary tract infection and urinary retention    Obstructive secondary to gravid uterus    Indwelling garber catheter until 24hr pp    Continue Macrobid  BID until 24hr post catheter removal    Urology input appreciated, recommend delivery at 37 wks   GBS pending     Plan:  Continue hospitalization with hospitalized bedrest  Garber to gravity   Excellent urine output   Dilaudid PO and pyridium for pain control   Proceed with induction on Monday  Will check cervix tomorrow to assess need for ripening     Orders/Charges: Medium and Non Stress Test    Patient states she has no new complaints  Persistent flank pain, bladder pressure    Will change PO pain medication regimen   Active fetal movement     Vitals:  Visit Vitals    /64 (BP 1 Location: Left arm, BP Patient Position: At rest)    Pulse 67    Temp 98.5 °F (36.9 °C)    Resp 18    Ht 5' 3\" (1.6 m)    Wt 82.8 kg (182 lb 8.7 oz)    SpO2 96%    Breastfeeding Yes    BMI 32.34 kg/m2     Temp (24hrs), Av.4 °F (36.9 °C), Min:98.1 °F (36.7 °C), Max:98.6 °F (37 °C)      Last 24hr Input/Output:    Intake/Output Summary (Last 24 hours) at 18 1322  Last data filed at 18 0830   Gross per 24 hour   Intake                0 ml   Output             3300 ml   Net            -3300 ml        Non stress test:  Reactive  An NST was performed and was reactive. The baseline FHR was 125. Moderate baseline  variability was noted. Accelerations of sufficient amplitude and duration were noted. There were no decelerations noted. Patient Vitals for the past 4 hrs: Mode Fetal Heart Rate Fetal Activity Variability Decelerations Accelerations RN Reviewed Strip?   18 1023 External 120 Present 6-25 BPM None Yes Yes    Patient Vitals for the past 4 hrs:    Mode Fetal Heart Rate Fetal Activity Variability Decelerations Accelerations RN Reviewed Strip?   18 1023 External 120 Present 6-25 BPM None Yes Yes        Uterine Activity: None     Exam:  Patient without distress.      Abdomen, fundus soft non-tender     Extremities, no redness or tenderness               Additional Exam: Deferred    Labs:     Lab Results   Component Value Date/Time    WBC 9.0 01/30/2018 10:38 PM    WBC 16.9 11/26/2017 05:00 PM    WBC 8.4 10/05/2017 09:00 PM    WBC 8.4 09/20/2017 05:54 PM    WBC 9.9 05/12/2016 09:29 PM    WBC 10.3 05/03/2016 01:26 AM    WBC 5.6 04/19/2016 02:32 PM    WBC 8.0 11/06/2015 09:11 PM    WBC 6.8 05/04/2015 03:44 AM    WBC 9.6 04/08/2015 05:45 PM    WBC 7.4 04/06/2015 06:20 PM    WBC 10.6 04/05/2015 06:45 PM    WBC 8.5 01/05/2015 11:40 AM    WBC 9.9 11/07/2014 09:45 PM    WBC 6.3 09/08/2014 01:35 AM    WBC 9.9 07/23/2014 04:50 PM    WBC 9.6 05/07/2014 05:55 AM    WBC 12.7 10/26/2010 06:00 PM    WBC 12.6 05/27/2010 03:54 AM    HGB 10.0 01/30/2018 10:38 PM    HGB 10.7 11/26/2017 05:00 PM    HGB 11.0 10/05/2017 09:00 PM    HGB 11.1 09/20/2017 05:54 PM    HGB 11.8 05/12/2016 09:29 PM    HGB 13.2 05/03/2016 01:26 AM    HGB 13.0 04/19/2016 02:32 PM    HGB 12.3 11/06/2015 09:11 PM    HGB 10.6 05/04/2015 03:44 AM    HGB 9.6 04/08/2015 05:45 PM    HGB 9.2 04/06/2015 06:20 PM    HGB 10.1 04/05/2015 06:45 PM    HGB 9.1 01/05/2015 11:40 AM    HGB 10.7 11/07/2014 09:45 PM    HGB 11.5 09/08/2014 01:35 AM    HGB 13.3 07/23/2014 04:50 PM    HGB 13.8 05/07/2014 05:55 AM    HGB 10.0 10/26/2010 06:00 PM    HGB 11.4 05/27/2010 03:54 AM    HCT 29.7 01/30/2018 10:38 PM    HCT 30.4 11/26/2017 05:00 PM    HCT 31.7 10/05/2017 09:00 PM    HCT 32.1 09/20/2017 05:54 PM    HCT 35.0 05/12/2016 09:29 PM    HCT 38.6 05/03/2016 01:26 AM    HCT 39.0 04/19/2016 02:32 PM    HCT 37.8 11/06/2015 09:11 PM    HCT 32.2 05/04/2015 03:44 AM    HCT 29.2 04/08/2015 05:45 PM    HCT 27.2 04/06/2015 06:20 PM    HCT 30.7 04/05/2015 06:45 PM    HCT 27.8 01/05/2015 11:40 AM    HCT 31.1 11/07/2014 09:45 PM    HCT 33.9 09/08/2014 01:35 AM    HCT 40.4 07/23/2014 04:50 PM    HCT 41.2 05/07/2014 05:55 AM    HCT 30.4 10/26/2010 06:00 PM    HCT 33.5 05/27/2010 03:54 AM    PLATELET 796 14/79/9483 10:38 PM    PLATELET 549 03/68/8672 05:00 PM    PLATELET 648 03/97/8552 09:00 PM    PLATELET 797 52/30/9658 05:54 PM    PLATELET 154 81/99/3492 09:29 PM    PLATELET 803 69/24/7174 01:26 AM    PLATELET 057 96/69/8390 02:32 PM    PLATELET 246 03/34/6530 09:11 PM    PLATELET 053 97/66/2679 03:44 AM    PLATELET 532 07/95/4497 05:45 PM    PLATELET 214 79/81/4571 06:20 PM    PLATELET 348 18/33/5352 06:45 PM    PLATELET 366 35/07/9152 11:40 AM    PLATELET 033 82/38/0786 09:45 PM    PLATELET 397 97/71/7826 01:35 AM    PLATELET 188 19/42/4615 04:50 PM    PLATELET 664 23/36/2658 05:55 AM    PLATELET 290 66/16/0117 06:00 PM    PLATELET 846 02/23/7554 03:54 AM       Recent Results (from the past 24 hour(s))   CULTURE, GENITAL GROUP B STREP    Collection Time: 02/02/18  5:53 PM   Result Value Ref Range    Special Requests: NO SPECIAL REQUESTS      Culture result: NO GROUP B BETA STREPTOCOCCUS ISOLATED , SO FAR

## 2018-02-03 NOTE — ROUTINE PROCESS
Bedside shift change report given to DEON Hubbard     Report consisted of patients Situation, Background, Assessment and Recommendations(SBAR). Opportunity for questions and clarification was provided. Care relinquished.

## 2018-02-04 LAB
BASOPHILS # BLD: 0 K/UL (ref 0–0.1)
BASOPHILS NFR BLD: 0 % (ref 0–1)
DIFFERENTIAL METHOD BLD: ABNORMAL
EOSINOPHIL # BLD: 0.2 K/UL (ref 0–0.4)
EOSINOPHIL NFR BLD: 2 % (ref 0–7)
ERYTHROCYTE [DISTWIDTH] IN BLOOD BY AUTOMATED COUNT: 14.1 % (ref 11.5–14.5)
HCT VFR BLD AUTO: 30.4 % (ref 35–47)
HGB BLD-MCNC: 10.2 G/DL (ref 11.5–16)
IMM GRANULOCYTES # BLD: 0 K/UL (ref 0–0.04)
IMM GRANULOCYTES NFR BLD AUTO: 0 % (ref 0–0.5)
LYMPHOCYTES # BLD: 1.7 K/UL (ref 0.8–3.5)
LYMPHOCYTES NFR BLD: 18 % (ref 12–49)
MCH RBC QN AUTO: 29.9 PG (ref 26–34)
MCHC RBC AUTO-ENTMCNC: 33.6 G/DL (ref 30–36.5)
MCV RBC AUTO: 89.1 FL (ref 80–99)
MONOCYTES # BLD: 0.6 K/UL (ref 0–1)
MONOCYTES NFR BLD: 6 % (ref 5–13)
NEUTS SEG # BLD: 7.1 K/UL (ref 1.8–8)
NEUTS SEG NFR BLD: 73 % (ref 32–75)
NRBC # BLD: 0 K/UL (ref 0–0.01)
NRBC BLD-RTO: 0 PER 100 WBC
PLATELET # BLD AUTO: 181 K/UL (ref 150–400)
PMV BLD AUTO: 9.7 FL (ref 8.9–12.9)
RBC # BLD AUTO: 3.41 M/UL (ref 3.8–5.2)
WBC # BLD AUTO: 9.6 K/UL (ref 3.6–11)

## 2018-02-04 PROCEDURE — 36415 COLL VENOUS BLD VENIPUNCTURE: CPT | Performed by: OBSTETRICS & GYNECOLOGY

## 2018-02-04 PROCEDURE — 74011250637 HC RX REV CODE- 250/637: Performed by: OBSTETRICS & GYNECOLOGY

## 2018-02-04 PROCEDURE — 74011250637 HC RX REV CODE- 250/637

## 2018-02-04 PROCEDURE — 85025 COMPLETE CBC W/AUTO DIFF WBC: CPT | Performed by: OBSTETRICS & GYNECOLOGY

## 2018-02-04 PROCEDURE — 75410000002 HC LABOR FEE PER 1 HR

## 2018-02-04 PROCEDURE — 74011250636 HC RX REV CODE- 250/636: Performed by: OBSTETRICS & GYNECOLOGY

## 2018-02-04 PROCEDURE — 65410000002 HC RM PRIVATE OB

## 2018-02-04 PROCEDURE — 77030018749 HC HK AMNIO DISP DERY -A

## 2018-02-04 RX ORDER — OXYTOCIN IN 5 % DEXTROSE 30/500 ML
1-25 PLASTIC BAG, INJECTION (ML) INTRAVENOUS
Status: DISCONTINUED | OUTPATIENT
Start: 2018-02-04 | End: 2018-02-07 | Stop reason: HOSPADM

## 2018-02-04 RX ORDER — SODIUM CHLORIDE 0.9 % (FLUSH) 0.9 %
5-10 SYRINGE (ML) INJECTION AS NEEDED
Status: DISCONTINUED | OUTPATIENT
Start: 2018-02-04 | End: 2018-02-07 | Stop reason: HOSPADM

## 2018-02-04 RX ORDER — SODIUM CHLORIDE 0.9 % (FLUSH) 0.9 %
5-10 SYRINGE (ML) INJECTION EVERY 8 HOURS
Status: DISCONTINUED | OUTPATIENT
Start: 2018-02-04 | End: 2018-02-07 | Stop reason: HOSPADM

## 2018-02-04 RX ADMIN — HYDROMORPHONE HYDROCHLORIDE 2 MG: 2 TABLET ORAL at 08:32

## 2018-02-04 RX ADMIN — HYDROMORPHONE HYDROCHLORIDE 2 MG: 2 TABLET ORAL at 13:14

## 2018-02-04 RX ADMIN — PHENAZOPYRIDINE HYDROCHLORIDE 200 MG: 100 TABLET ORAL at 13:14

## 2018-02-04 RX ADMIN — DOCUSATE SODIUM 100 MG: 100 CAPSULE, LIQUID FILLED ORAL at 08:32

## 2018-02-04 RX ADMIN — Medication 1 TABLET: at 08:32

## 2018-02-04 RX ADMIN — PHENAZOPYRIDINE HYDROCHLORIDE 200 MG: 100 TABLET ORAL at 08:32

## 2018-02-04 RX ADMIN — ZOLPIDEM TARTRATE 5 MG: 5 TABLET ORAL at 20:54

## 2018-02-04 RX ADMIN — HYDROMORPHONE HYDROCHLORIDE 2 MG: 2 TABLET ORAL at 03:43

## 2018-02-04 RX ADMIN — PHENAZOPYRIDINE HYDROCHLORIDE 200 MG: 100 TABLET ORAL at 19:38

## 2018-02-04 RX ADMIN — NITROFURANTOIN (MONOHYDRATE/MACROCRYSTALS) 100 MG: 75; 25 CAPSULE ORAL at 20:54

## 2018-02-04 RX ADMIN — DINOPROSTONE 10 MG: 10 INSERT VAGINAL at 17:09

## 2018-02-04 RX ADMIN — SODIUM CHLORIDE, SODIUM LACTATE, POTASSIUM CHLORIDE, AND CALCIUM CHLORIDE 75 ML/HR: 600; 310; 30; 20 INJECTION, SOLUTION INTRAVENOUS at 03:43

## 2018-02-04 RX ADMIN — HYDROMORPHONE HYDROCHLORIDE 2 MG: 2 TABLET ORAL at 18:56

## 2018-02-04 RX ADMIN — NITROFURANTOIN (MONOHYDRATE/MACROCRYSTALS) 100 MG: 75; 25 CAPSULE ORAL at 08:32

## 2018-02-04 RX ADMIN — ONDANSETRON 4 MG: 4 TABLET, ORALLY DISINTEGRATING ORAL at 09:40

## 2018-02-04 NOTE — PROGRESS NOTES
7378 Report received from [facility] Rosette RN.   7839 Pt awakened for VS and assessment. Pt wishes pain medication. Will get up and eat breakfast then do NST.

## 2018-02-04 NOTE — PROGRESS NOTES
Spiritual Care Assessment/Progress Notes    Ariadne Michaels [id number]  [id number]    1990  33 y.o.  female    Patient Telephone Number: [phone] (home)   Christianity Affiliation: Chetan Siddiqui   Language: English   Extended Emergency Contact Information  Primary Emergency Contact: Helga Pena  Address: [address] [place] [address] Ildefonso Joshi  Home Phone: [phone]  Relation: Mother   Patient Active Problem List    Diagnosis Date Noted    Flank pain 01/31/2018    Abdominal pressure 01/30/2018    UTI in pregnancy, antepartum, third trimester 01/30/2018    Pregnancy 04/08/2015    Pregnancy related back pain in third trimester, antepartum 03/22/2015     Class: Present on Admission    Pyelonephritis 01/06/2015        Date: 2/4/2018       Level of Christianity/Spiritual Activity:  []         Involved in tj tradition/spiritual practice    []         Not involved in tj tradition/spiritual practice  []         Spiritually oriented    []         Claims no spiritual orientation    []         seeking spiritual identity  []         Feels alienated from Yazdanism practice/tradition  []         Feels angry about Yazdanism practice/tradition  []         Spirituality/Yazdanism tradition may be a resource for coping at this time.   [x]         Not able to assess     Services Provided Today:  []         crisis intervention    []         reading Scriptures  []         spiritual assessment    []         prayer  []         empathic listening/emotional support  []         rites and rituals (cite in comments)  []         life review     []         Yazdanism support  []         theological development   []         advocacy  []         ethical dialog     []         blessing  []         bereavement support    []         support to family  []         anticipatory grief support   []         help with AMD  []         spiritual guidance    []         meditation      Spiritual Care Needs  [] Emotional Support  []         Spiritual/Methodist Care  []         Loss/Adjustment  []         Advocacy/Referral                /Ethics  []         No needs expressed at               this time  []         Other: (note in               comments)  [address]  []         Follow up visits with               pt/family  []         Provide materials  []         Schedule sacraments  []         Contact Community               Clergy  []         Follow up as needed  []         Other: (note in               comments)     Comments: Attempted initial visit with patient in 3182. Patient and/or family were not present in the room at initial time of attempted visit. On second attempt this  heard patient and  state their wishes to be given privacy to rest and prepare for patient's delivery tomorrow morning and therefore respected patient and family's request. Please page [phone] Jose Martin Carlton as needed and/or desired. nolberto Eduardo M.Div.    Paging Service [phone])

## 2018-02-04 NOTE — PROGRESS NOTES
Pt transferred to 3167 for cervidil induction. Dr Sagar Montague in at 1710 Cervidil placed 1 cm 50% -2   1803 Pt sitting up to eat.  Unable to trace fhr.

## 2018-02-04 NOTE — PROGRESS NOTES
Ante Partum Progress Note    Narinder Shown  37w0d    Assessment: 37w0d   Urinary tract infection, persistent   Urinary retention, obstructive effect from gravid uterus    Per urology persistnet bilateral hydronephrosis and recommend 37wk delivery    Garber to remain until 24hr pp   Continue macrobid BID until 24hr post cathter removal   GBS pending (sent 18)    Plan:  Continue hospitalization with hospitalized bedrest and Proceed with delivery Cervical ripening today  via induction for indications of worsening maternal status and urinary obstruction requiring indwelling garber. Risks and benefits of delivery at this gestational age are reviewed at length with the patient. Orders/Charges: Medium and Non Stress Test  An NST was performed and was reactive. The baseline FHR was 140. Moderate baseline  variability was noted. Accelerations of sufficient amplitude and duration were noted. There were no decelerations noted. Patient states she has no new complaints   Active FM    Some improvement in pain control with PO dilaudid     Vitals:  Visit Vitals    /48 (BP 1 Location: Left arm, BP Patient Position: At rest)    Pulse 69    Temp 98.4 °F (36.9 °C)    Resp 16    Ht 5' 3\" (1.6 m)    Wt 82.8 kg (182 lb 8.7 oz)    SpO2 98%    Breastfeeding Yes    BMI 32.34 kg/m2     Temp (24hrs), Av.4 °F (36.9 °C), Min:98.2 °F (36.8 °C), Max:98.5 °F (36.9 °C)      Last 24hr Input/Output:    Intake/Output Summary (Last 24 hours) at 18 1105  Last data filed at 18 0947   Gross per 24 hour   Intake          5471.25 ml   Output             3600 ml   Net          1871.25 ml        Non stress test:  Reactive    Patient Vitals for the past 4 hrs: Mode Fetal Heart Rate Fetal Activity Variability Decelerations Accelerations RN Reviewed Strip? Provider who reviewed strip?  Non Stress Test   18 1020 External 130 Present 6-25 BPM None Yes Yes Dr Lisa Whitmore Reactive   18 0947 External 130 - - - - - - -    Patient Vitals for the past 4 hrs: Mode Fetal Heart Rate Fetal Activity Variability Decelerations Accelerations RN Reviewed Strip? Provider who reviewed strip? Non Stress Test   02/04/18 1020 External 130 Present 6-25 BPM None Yes Yes Dr Shena Cummins Reactive   02/04/18 0947 External 130 - - - - - - -        Uterine Activity: None     Exam:  Patient without distress.      Abdomen, fundus soft non-tender     Extremities, no redness or tenderness               Additional Exam: Dilation: 1 cm, Effacement: 50% and Station: -3  Soft, midposition cervix   Vertex  Membranes intact     Labs:     Lab Results   Component Value Date/Time    WBC 9.0 01/30/2018 10:38 PM    WBC 16.9 11/26/2017 05:00 PM    WBC 8.4 10/05/2017 09:00 PM    WBC 8.4 09/20/2017 05:54 PM    WBC 9.9 05/12/2016 09:29 PM    WBC 10.3 05/03/2016 01:26 AM    WBC 5.6 04/19/2016 02:32 PM    WBC 8.0 11/06/2015 09:11 PM    WBC 6.8 05/04/2015 03:44 AM    WBC 9.6 04/08/2015 05:45 PM    WBC 7.4 04/06/2015 06:20 PM    WBC 10.6 04/05/2015 06:45 PM    WBC 8.5 01/05/2015 11:40 AM    WBC 9.9 11/07/2014 09:45 PM    WBC 6.3 09/08/2014 01:35 AM    WBC 9.9 07/23/2014 04:50 PM    WBC 9.6 05/07/2014 05:55 AM    WBC 12.7 10/26/2010 06:00 PM    WBC 12.6 05/27/2010 03:54 AM    HGB 10.0 01/30/2018 10:38 PM    HGB 10.7 11/26/2017 05:00 PM    HGB 11.0 10/05/2017 09:00 PM    HGB 11.1 09/20/2017 05:54 PM    HGB 11.8 05/12/2016 09:29 PM    HGB 13.2 05/03/2016 01:26 AM    HGB 13.0 04/19/2016 02:32 PM    HGB 12.3 11/06/2015 09:11 PM    HGB 10.6 05/04/2015 03:44 AM    HGB 9.6 04/08/2015 05:45 PM    HGB 9.2 04/06/2015 06:20 PM    HGB 10.1 04/05/2015 06:45 PM    HGB 9.1 01/05/2015 11:40 AM    HGB 10.7 11/07/2014 09:45 PM    HGB 11.5 09/08/2014 01:35 AM    HGB 13.3 07/23/2014 04:50 PM    HGB 13.8 05/07/2014 05:55 AM    HGB 10.0 10/26/2010 06:00 PM    HGB 11.4 05/27/2010 03:54 AM    HCT 29.7 01/30/2018 10:38 PM    HCT 30.4 11/26/2017 05:00 PM    HCT 31.7 10/05/2017 09:00 PM    HCT 32.1 09/20/2017 05:54 PM    HCT 35.0 05/12/2016 09:29 PM    HCT 38.6 05/03/2016 01:26 AM    HCT 39.0 04/19/2016 02:32 PM    HCT 37.8 11/06/2015 09:11 PM    HCT 32.2 05/04/2015 03:44 AM    HCT 29.2 04/08/2015 05:45 PM    HCT 27.2 04/06/2015 06:20 PM    HCT 30.7 04/05/2015 06:45 PM    HCT 27.8 01/05/2015 11:40 AM    HCT 31.1 11/07/2014 09:45 PM    HCT 33.9 09/08/2014 01:35 AM    HCT 40.4 07/23/2014 04:50 PM    HCT 41.2 05/07/2014 05:55 AM    HCT 30.4 10/26/2010 06:00 PM    HCT 33.5 05/27/2010 03:54 AM    PLATELET 075 64/94/8345 10:38 PM    PLATELET 092 44/65/3352 05:00 PM    PLATELET 596 05/24/1927 09:00 PM    PLATELET 233 03/32/0245 05:54 PM    PLATELET 265 99/53/9332 09:29 PM    PLATELET 412 94/76/7307 01:26 AM    PLATELET 520 52/14/2800 02:32 PM    PLATELET 704 30/46/3822 09:11 PM    PLATELET 221 37/54/6093 03:44 AM    PLATELET 700 07/04/6858 05:45 PM    PLATELET 756 53/62/2782 06:20 PM    PLATELET 756 11/53/0832 06:45 PM    PLATELET 222 53/10/2686 11:40 AM    PLATELET 917 61/40/6489 09:45 PM    PLATELET 943 88/16/8601 01:35 AM    PLATELET 345 45/91/1574 04:50 PM    PLATELET 629 36/81/2558 05:55 AM    PLATELET 084 12/52/7100 06:00 PM    PLATELET 152 06/84/1706 03:54 AM       No results found for this or any previous visit (from the past 24 hour(s)).

## 2018-02-05 ENCOUNTER — ANESTHESIA (OUTPATIENT)
Dept: LABOR AND DELIVERY | Age: 28
DRG: 560 | End: 2018-02-05
Payer: COMMERCIAL

## 2018-02-05 ENCOUNTER — ANESTHESIA EVENT (OUTPATIENT)
Dept: LABOR AND DELIVERY | Age: 28
DRG: 560 | End: 2018-02-05
Payer: COMMERCIAL

## 2018-02-05 LAB
BACTERIA SPEC CULT: NORMAL
SERVICE CMNT-IMP: NORMAL

## 2018-02-05 PROCEDURE — 75410000003 HC RECOV DEL/VAG/CSECN EA 0.5 HR

## 2018-02-05 PROCEDURE — 74011000250 HC RX REV CODE- 250

## 2018-02-05 PROCEDURE — 75410000002 HC LABOR FEE PER 1 HR

## 2018-02-05 PROCEDURE — 74011250637 HC RX REV CODE- 250/637: Performed by: OBSTETRICS & GYNECOLOGY

## 2018-02-05 PROCEDURE — 76060000078 HC EPIDURAL ANESTHESIA

## 2018-02-05 PROCEDURE — 10907ZC DRAINAGE OF AMNIOTIC FLUID, THERAPEUTIC FROM PRODUCTS OF CONCEPTION, VIA NATURAL OR ARTIFICIAL OPENING: ICD-10-PCS | Performed by: OBSTETRICS & GYNECOLOGY

## 2018-02-05 PROCEDURE — 74011250636 HC RX REV CODE- 250/636

## 2018-02-05 PROCEDURE — 77030014125 HC TY EPDRL BBMI -B: Performed by: ANESTHESIOLOGY

## 2018-02-05 PROCEDURE — 74011250637 HC RX REV CODE- 250/637

## 2018-02-05 PROCEDURE — 75410000000 HC DELIVERY VAGINAL/SINGLE

## 2018-02-05 PROCEDURE — 74011000258 HC RX REV CODE- 258: Performed by: OBSTETRICS & GYNECOLOGY

## 2018-02-05 PROCEDURE — 77030034849

## 2018-02-05 PROCEDURE — 65410000002 HC RM PRIVATE OB

## 2018-02-05 PROCEDURE — 74011250636 HC RX REV CODE- 250/636: Performed by: OBSTETRICS & GYNECOLOGY

## 2018-02-05 PROCEDURE — 3E033VJ INTRODUCTION OF OTHER HORMONE INTO PERIPHERAL VEIN, PERCUTANEOUS APPROACH: ICD-10-PCS | Performed by: OBSTETRICS & GYNECOLOGY

## 2018-02-05 RX ORDER — OXYTOCIN/RINGER'S LACTATE 20/1000 ML
125-500 PLASTIC BAG, INJECTION (ML) INTRAVENOUS ONCE
Status: ACTIVE | OUTPATIENT
Start: 2018-02-05 | End: 2018-02-06

## 2018-02-05 RX ORDER — BUPIVACAINE HYDROCHLORIDE 2.5 MG/ML
INJECTION, SOLUTION EPIDURAL; INFILTRATION; INTRACAUDAL
Status: DISPENSED
Start: 2018-02-05 | End: 2018-02-05

## 2018-02-05 RX ORDER — HYDROCORTISONE ACETATE PRAMOXINE HCL 2.5; 1 G/100G; G/100G
CREAM TOPICAL AS NEEDED
Status: DISCONTINUED | OUTPATIENT
Start: 2018-02-05 | End: 2018-02-07 | Stop reason: HOSPADM

## 2018-02-05 RX ORDER — OXYCODONE AND ACETAMINOPHEN 5; 325 MG/1; MG/1
1-2 TABLET ORAL
Status: DISCONTINUED | OUTPATIENT
Start: 2018-02-05 | End: 2018-02-06

## 2018-02-05 RX ORDER — NALOXONE HYDROCHLORIDE 0.4 MG/ML
0.4 INJECTION, SOLUTION INTRAMUSCULAR; INTRAVENOUS; SUBCUTANEOUS AS NEEDED
Status: DISCONTINUED | OUTPATIENT
Start: 2018-02-05 | End: 2018-02-07 | Stop reason: HOSPADM

## 2018-02-05 RX ORDER — SODIUM CHLORIDE 900 MG/100ML
INJECTION INTRAVENOUS
Status: DISPENSED
Start: 2018-02-05 | End: 2018-02-05

## 2018-02-05 RX ORDER — ACETAMINOPHEN 325 MG/1
TABLET ORAL
Status: COMPLETED
Start: 2018-02-05 | End: 2018-02-05

## 2018-02-05 RX ORDER — HYDROMORPHONE HYDROCHLORIDE 2 MG/1
2 TABLET ORAL
Status: DISCONTINUED | OUTPATIENT
Start: 2018-02-05 | End: 2018-02-05

## 2018-02-05 RX ORDER — FENTANYL/BUPIVACAINE/NS/PF 2-1250MCG
PREFILLED PUMP RESERVOIR EPIDURAL
Status: COMPLETED
Start: 2018-02-05 | End: 2018-02-05

## 2018-02-05 RX ORDER — NITROFURANTOIN 25; 75 MG/1; MG/1
100 CAPSULE ORAL 2 TIMES DAILY
Status: CANCELLED | OUTPATIENT
Start: 2018-02-05

## 2018-02-05 RX ORDER — ACETAMINOPHEN 325 MG/1
650 TABLET ORAL
Status: DISCONTINUED | OUTPATIENT
Start: 2018-02-05 | End: 2018-02-06

## 2018-02-05 RX ORDER — HYDROCORTISONE ACETATE PRAMOXINE HCL 2.5; 1 G/100G; G/100G
CREAM TOPICAL AS NEEDED
Status: DISCONTINUED | OUTPATIENT
Start: 2018-02-05 | End: 2018-02-05

## 2018-02-05 RX ORDER — ACETAMINOPHEN 325 MG/1
650 TABLET ORAL
Status: DISCONTINUED | OUTPATIENT
Start: 2018-02-05 | End: 2018-02-05 | Stop reason: SDUPTHER

## 2018-02-05 RX ADMIN — OXYCODONE HYDROCHLORIDE AND ACETAMINOPHEN 1 TABLET: 5; 325 TABLET ORAL at 22:32

## 2018-02-05 RX ADMIN — NITROFURANTOIN (MONOHYDRATE/MACROCRYSTALS) 100 MG: 75; 25 CAPSULE ORAL at 21:01

## 2018-02-05 RX ADMIN — SODIUM CHLORIDE, SODIUM LACTATE, POTASSIUM CHLORIDE, AND CALCIUM CHLORIDE 125 ML/HR: 600; 310; 30; 20 INJECTION, SOLUTION INTRAVENOUS at 06:15

## 2018-02-05 RX ADMIN — PENICILLIN G POTASSIUM 2.5 MILLION UNITS: 20000000 POWDER, FOR SOLUTION INTRAVENOUS at 04:59

## 2018-02-05 RX ADMIN — ACETAMINOPHEN 650 MG: 325 TABLET ORAL at 23:44

## 2018-02-05 RX ADMIN — NITROFURANTOIN (MONOHYDRATE/MACROCRYSTALS) 100 MG: 75; 25 CAPSULE ORAL at 08:46

## 2018-02-05 RX ADMIN — ACETAMINOPHEN 650 MG: 325 TABLET ORAL at 10:21

## 2018-02-05 RX ADMIN — ACETAMINOPHEN 650 MG: 325 TABLET ORAL at 19:28

## 2018-02-05 RX ADMIN — FENTANYL 0.2 MG/100ML-BUPIV 0.125%-NACL 0.9% EPIDURAL INJ 12 ML/HR: 2/0.125 SOLUTION at 09:38

## 2018-02-05 RX ADMIN — SODIUM CHLORIDE 5 MILLION UNITS: 900 INJECTION, SOLUTION INTRAVENOUS at 00:18

## 2018-02-05 RX ADMIN — Medication 1 MILLI-UNITS/MIN: at 06:40

## 2018-02-05 NOTE — PROGRESS NOTES
Bedside and Verbal shift change report given to RN C School and RN [place] Sep (oncoming nurse) by Britany Ngo RN  (offgoing nurse). Report given with Umer TAM and MAR.

## 2018-02-05 NOTE — PROGRESS NOTES
TRANSFER - IN REPORT:    Verbal report received from [facility] on [place]  being received from L&D for routine progression of care      Report consisted of patients Situation, Background, Assessment and   Recommendations(SBAR). Information from the following report(s) SBAR, Kardex, Procedure Summary, Intake/Output, MAR, Recent Results and Med Rec Status was reviewed with the receiving nurse. Opportunity for questions and clarification was provided. Assessment completed upon patients arrival to unit and care assumed.

## 2018-02-05 NOTE — L&D DELIVERY NOTE
Delivery Summary  Patient: Anselmo Lombardo             Circumcision:   NA-female  Additional Delivery Comments - IOL due to urinary obstruction/hydronephrosis from gravid uterus. Requiring garber catheter and oral antibioitics. Had cervidil ripening and pitocin augmentation. Progressed quickly through labor after arom. Began pushing when c/c/+2. Fetal head rotated 180 degrees and delivered OA. Fetal head delivered easily. Shoulders were coming slowly with gentle downward traction. Pt laid flat. Asked for suprapubic pressure but shoulder came under pubic bone prior to any pressure. Left shoulder was under pubic bone but right arm seemed to have some bruising vs birthmark. Baby placed on maternal abdomen with good cry. Placenta delivered spontaneously and intact with no abnormalities. Garber catheter replaced with sterile technique. Information for the patient's :  Lynnette August [[id number]]       Labor Events:    Labor: No   Rupture Date: 2018   Rupture Time: 9:59 AM   Rupture Type AROM   Amniotic Fluid Volume: Moderate    Amniotic Fluid Description: Blood stained None   Induction: AROM; Oxytocin       Augmentation: None   Labor Events: None     Cervical Ripening:     None     Delivery Events:  Episiotomy: None   Laceration(s): None     Repaired: None    Number of Repair Packets:     Suture Type and Size: None     Estimated Blood Loss (ml): 250ml       Delivery Date: 2018    Delivery Time: 3:10 PM  Delivery Type: Vaginal, Spontaneous Delivery  Sex:  Female     Gestational Age: 42w4d   Delivery Clinician:  Ellen  Living Status: Living   Delivery Location: L&D            APGARS  One minute Five minutes Ten minutes   Skin color:            Heart rate:            Grimace:            Muscle tone:            Breathing: Totals:                Presentation: Vertex    Position:        Resuscitation Method:  Suctioning-bulb; Tactile Stimulation     Meconium Stained: None      Cord Vessels: 3 Vessels      Cord Events:    Cord Blood Sent?:  Yes    Blood Gases Sent?:  No    Placenta:  Date/Time:   3:15 PM  Removal: Spontaneous      Appearance: Intact     [place] Measurements:  Birth Weight: 2.895 kg      Birth Length: 51.4 cm      Head Circumference: 33.7 cm      Chest Circumference: 31.8 cm     Abdominal Girth: 31.1 cm    Other Providers:   NGOZI COHN;JENNY STEPHENS;LEXII COLEY;SHELIA KWONG, Obstetrician;Primary Nurse;Primary [place] Nurse;Scrub Tech           Cord pH:  none    Episiotomy: None   Laceration(s): None     Estimated Blood Loss (ml): 250    Labor Events  Method: AROM; Oxytocin      Augmentation: None   Cervical Ripening:       None        Hospital Problems  Date Reviewed: 2015          Codes Class Noted POA    Flank pain ICD-10-CM: R10.9  ICD-9-CM: 789.09  2018 Unknown        Abdominal pressure ICD-10-CM: R10.9  ICD-9-CM: 789.00  2018 Unknown        UTI in pregnancy, antepartum, third trimester ICD-10-CM: O23.43  ICD-9-CM: 646.63, 599.0  2018 Unknown              Operative Vaginal Delivery - none    Group B Strep:   Lab Results   Component Value Date/Time    Lev External neg 2015     Information for the patient's :  Robert Freedman [[id number]]   No results found for: ABORH, PCTABR, PCTDIG, BILI, ABORHEXT, ABORH    No results found for: APH, APCO2, APO2, AHCO3, ABEC, ABDC, O2ST, EPHV, PCO2V, PO2V, HCO3V, EBEV, EBDV, SITE, RSCOM

## 2018-02-05 NOTE — PROGRESS NOTES
Pt slept through the night, no complaint.  Cervidil removed without difficulty    0530 pt off monitor to shower

## 2018-02-05 NOTE — PROGRESS NOTES
1103- dr. Tim Lezama in. Pt to sitting position on side of bed. Maternal hr tracing. Time out done    09- dr. Vogel in. Strip reviewed. sve done with arom    1350- pt c/o feeling rectal pressure, sve done. 1356- dr. Tammy Lerner notified of sve done    1502- sve done for rectal pressure. Pt 10 and wants to push, dr. Tammy Lerner called for delivery    12-  of live female infant    0- pt up to br and gait steady. Denise care done. report to chloe cooley.

## 2018-02-05 NOTE — ANESTHESIA PROCEDURE NOTES
Epidural Block    Performed by: Sánchez Root  Authorized by: Sánchez Root     Pre-Procedure  Indication: labor epidural    Preanesthetic Checklist: patient identified, risks and benefits discussed, anesthesia consent, site marked, patient being monitored, timeout performed and anesthesia consent      Epidural:   Patient position:  Seated  Prep region:  Lumbar  Prep: DuraPrep    Location:  L3-4    Needle and Epidural Catheter:   Needle Type:  Tuohy  Needle Gauge:  17 G  Injection Technique:  Loss of resistance using air  Attempts:  1  Catheter Size:  19 G  Catheter at Skin Depth (cm):  12  Events: no blood with aspiration, no cerebrospinal fluid with aspiration, no paresthesia and negative aspiration test    Test Dose:  Negative and lidocaine 1.0% w/ epi    Assessment:   Catheter Secured:  Tape and tegaderm  Insertion:  Uncomplicated  Patient tolerance:  Patient tolerated the procedure well with no immediate complications

## 2018-02-05 NOTE — ANESTHESIA PREPROCEDURE EVALUATION
Anesthetic History   No history of anesthetic complications            Review of Systems / Medical History  Patient summary reviewed, nursing notes reviewed and pertinent labs reviewed    Pulmonary  Within defined limits                 Neuro/Psych   Within defined limits           Cardiovascular  Within defined limits                Exercise tolerance: >4 METS     GI/Hepatic/Renal         Renal disease: CRI       Endo/Other             Other Findings              Physical Exam    Airway  Mallampati: II  TM Distance: 4 - 6 cm  Neck ROM: normal range of motion   Mouth opening: Normal     Cardiovascular  Regular rate and rhythm,  S1 and S2 normal,  no murmur, click, rub, or gallop             Dental  No notable dental hx       Pulmonary  Breath sounds clear to auscultation               Abdominal  GI exam deferred       Other Findings            Anesthetic Plan    ASA: 2  Anesthesia type: epidural            Anesthetic plan and risks discussed with: Patient and Family

## 2018-02-06 ENCOUNTER — APPOINTMENT (OUTPATIENT)
Dept: ULTRASOUND IMAGING | Age: 28
DRG: 560 | End: 2018-02-06
Attending: OBSTETRICS & GYNECOLOGY
Payer: COMMERCIAL

## 2018-02-06 LAB
ANION GAP SERPL CALC-SCNC: 8 MMOL/L (ref 5–15)
APPEARANCE UR: ABNORMAL
BACTERIA URNS QL MICRO: NEGATIVE /HPF
BASOPHILS # BLD: 0.1 K/UL (ref 0–0.1)
BASOPHILS NFR BLD: 1 % (ref 0–1)
BILIRUB UR QL CFM: POSITIVE
BUN SERPL-MCNC: 8 MG/DL (ref 6–20)
BUN/CREAT SERPL: 14 (ref 12–20)
CALCIUM SERPL-MCNC: 8.9 MG/DL (ref 8.5–10.1)
CHLORIDE SERPL-SCNC: 108 MMOL/L (ref 97–108)
CO2 SERPL-SCNC: 24 MMOL/L (ref 21–32)
COLOR UR: ABNORMAL
CREAT SERPL-MCNC: 0.59 MG/DL (ref 0.55–1.02)
DIFFERENTIAL METHOD BLD: ABNORMAL
EOSINOPHIL # BLD: 0.2 K/UL (ref 0–0.4)
EOSINOPHIL NFR BLD: 2 % (ref 0–7)
EPITH CASTS URNS QL MICRO: ABNORMAL /LPF
ERYTHROCYTE [DISTWIDTH] IN BLOOD BY AUTOMATED COUNT: 14.2 % (ref 11.5–14.5)
GLUCOSE SERPL-MCNC: 121 MG/DL (ref 65–100)
GLUCOSE UR STRIP.AUTO-MCNC: NEGATIVE MG/DL
HCT VFR BLD AUTO: 30.1 % (ref 35–47)
HGB BLD-MCNC: 10.1 G/DL (ref 11.5–16)
HGB UR QL STRIP: NEGATIVE
IMM GRANULOCYTES # BLD: 0.1 K/UL (ref 0–0.04)
IMM GRANULOCYTES NFR BLD AUTO: 1 % (ref 0–0.5)
KETONES UR QL STRIP.AUTO: 15 MG/DL
LEUKOCYTE ESTERASE UR QL STRIP.AUTO: ABNORMAL
LYMPHOCYTES # BLD: 1.7 K/UL (ref 0.8–3.5)
LYMPHOCYTES NFR BLD: 17 % (ref 12–49)
MCH RBC QN AUTO: 30 PG (ref 26–34)
MCHC RBC AUTO-ENTMCNC: 33.6 G/DL (ref 30–36.5)
MCV RBC AUTO: 89.3 FL (ref 80–99)
MONOCYTES # BLD: 0.5 K/UL (ref 0–1)
MONOCYTES NFR BLD: 5 % (ref 5–13)
NEUTS SEG # BLD: 7.6 K/UL (ref 1.8–8)
NEUTS SEG NFR BLD: 75 % (ref 32–75)
NITRITE UR QL STRIP.AUTO: POSITIVE
NRBC # BLD: 0 K/UL (ref 0–0.01)
NRBC BLD-RTO: 0 PER 100 WBC
PH UR STRIP: 6.5 [PH] (ref 5–8)
PLATELET # BLD AUTO: 199 K/UL (ref 150–400)
PMV BLD AUTO: 9.7 FL (ref 8.9–12.9)
POTASSIUM SERPL-SCNC: 3.8 MMOL/L (ref 3.5–5.1)
PROT UR STRIP-MCNC: ABNORMAL MG/DL
RBC # BLD AUTO: 3.37 M/UL (ref 3.8–5.2)
RBC #/AREA URNS HPF: ABNORMAL /HPF (ref 0–5)
SODIUM SERPL-SCNC: 140 MMOL/L (ref 136–145)
SP GR UR REFRACTOMETRY: 1.02 (ref 1–1.03)
UROBILINOGEN UR QL STRIP.AUTO: 4 EU/DL (ref 0.2–1)
WBC # BLD AUTO: 10.1 K/UL (ref 3.6–11)
WBC URNS QL MICRO: ABNORMAL /HPF (ref 0–4)

## 2018-02-06 PROCEDURE — 74011250637 HC RX REV CODE- 250/637: Performed by: OBSTETRICS & GYNECOLOGY

## 2018-02-06 PROCEDURE — 76770 US EXAM ABDO BACK WALL COMP: CPT

## 2018-02-06 PROCEDURE — 36415 COLL VENOUS BLD VENIPUNCTURE: CPT | Performed by: OBSTETRICS & GYNECOLOGY

## 2018-02-06 PROCEDURE — 81001 URINALYSIS AUTO W/SCOPE: CPT | Performed by: OBSTETRICS & GYNECOLOGY

## 2018-02-06 PROCEDURE — 80048 BASIC METABOLIC PNL TOTAL CA: CPT | Performed by: OBSTETRICS & GYNECOLOGY

## 2018-02-06 PROCEDURE — 65410000002 HC RM PRIVATE OB

## 2018-02-06 PROCEDURE — 85025 COMPLETE CBC W/AUTO DIFF WBC: CPT | Performed by: OBSTETRICS & GYNECOLOGY

## 2018-02-06 RX ORDER — IBUPROFEN 400 MG/1
800 TABLET ORAL EVERY 8 HOURS
Status: DISCONTINUED | OUTPATIENT
Start: 2018-02-06 | End: 2018-02-07 | Stop reason: HOSPADM

## 2018-02-06 RX ORDER — HYDROMORPHONE HYDROCHLORIDE 2 MG/1
4 TABLET ORAL
Status: DISCONTINUED | OUTPATIENT
Start: 2018-02-06 | End: 2018-02-07 | Stop reason: HOSPADM

## 2018-02-06 RX ORDER — OXYCODONE HYDROCHLORIDE 5 MG/1
5-10 TABLET ORAL
Status: DISCONTINUED | OUTPATIENT
Start: 2018-02-06 | End: 2018-02-06

## 2018-02-06 RX ADMIN — PHENAZOPYRIDINE HYDROCHLORIDE 200 MG: 100 TABLET ORAL at 08:36

## 2018-02-06 RX ADMIN — Medication 1 TABLET: at 08:37

## 2018-02-06 RX ADMIN — HYDROMORPHONE HYDROCHLORIDE 4 MG: 2 TABLET ORAL at 18:11

## 2018-02-06 RX ADMIN — OXYCODONE HYDROCHLORIDE 10 MG: 5 TABLET ORAL at 09:51

## 2018-02-06 RX ADMIN — PHENAZOPYRIDINE HYDROCHLORIDE 200 MG: 100 TABLET ORAL at 18:11

## 2018-02-06 RX ADMIN — IBUPROFEN 800 MG: 400 TABLET, FILM COATED ORAL at 16:00

## 2018-02-06 RX ADMIN — DOCUSATE SODIUM 100 MG: 100 CAPSULE, LIQUID FILLED ORAL at 18:56

## 2018-02-06 RX ADMIN — Medication 10 ML: at 12:50

## 2018-02-06 RX ADMIN — HYDROMORPHONE HYDROCHLORIDE 4 MG: 2 TABLET ORAL at 12:49

## 2018-02-06 RX ADMIN — NITROFURANTOIN (MONOHYDRATE/MACROCRYSTALS) 100 MG: 75; 25 CAPSULE ORAL at 20:52

## 2018-02-06 RX ADMIN — ACETAMINOPHEN 650 MG: 325 TABLET ORAL at 03:41

## 2018-02-06 RX ADMIN — HYDROMORPHONE HYDROCHLORIDE 4 MG: 2 TABLET ORAL at 22:33

## 2018-02-06 RX ADMIN — NITROFURANTOIN (MONOHYDRATE/MACROCRYSTALS) 100 MG: 75; 25 CAPSULE ORAL at 08:37

## 2018-02-06 RX ADMIN — PHENAZOPYRIDINE HYDROCHLORIDE 200 MG: 100 TABLET ORAL at 12:49

## 2018-02-06 RX ADMIN — OXYCODONE HYDROCHLORIDE AND ACETAMINOPHEN 2 TABLET: 5; 325 TABLET ORAL at 04:35

## 2018-02-06 NOTE — PROGRESS NOTES
Initial Nutrition Assessment:    INTERVENTIONS/RECOMMENDATIONS:   · Meals/Snacks: General/healthful diet:  Continue regular diet. ASSESSMENT:   2/6:  Chart reviewed; med noted for for pos-partum day 1. Pt is on a regular diet and tolerating well. Pt expressed some challenges regarding meal service during hospital stay (inconsistencies and missing items). Clinical Nutrition Manager assisted with lunch order and prepared a special dessert of choice for both the patient and the father of the baby and personally delivered to bedside. Pt and father complimentary/very appreciative. No acute medical nutrition concerns at this time. Diet Order: Regular  % Eaten:  No data found. Pertinent Medications: [x]Reviewed []Other  Pertinent Labs: [x]Reviewed []Other  Food Allergies: [x]None []Other   Last BM:    [x]Active     []Hyperactive  []Hypoactive       [] Absent BS  Skin:    [x] Intact   [] Incision  [] Breakdown  [] Other:    Anthropometrics:   Height: 5' 3\" (160 cm) Weight: 82.8 kg (182 lb 8.7 oz)   IBW (%IBW):   ( ) UBW (%UBW):   (  %)   Last Weight Metrics:  Weight Loss Metrics 1/30/2018 11/26/2017 10/5/2017 9/23/2017 9/20/2017 8/11/2017 6/6/2017   Today's Wt 182 lb 8.7 oz 172 lb 165 lb 171 lb 4.8 oz 169 lb 12.1 oz 160 lb 161 lb 13.1 oz   BMI 32.34 kg/m2 29.52 kg/m2 28.32 kg/m2 29.4 kg/m2 29.14 kg/m2 27.46 kg/m2 27.78 kg/m2       BMI: Body mass index is 32.34 kg/(m^2). This BMI is indicative of:   []Underweight    []Normal    []Overweight    [] Obesity   [] Extreme Obesity (BMI>40)     Estimated Nutrition Needs (Based on):   1998 Kcals/day (BMR (1537) x 1. 3AF) , 83 g (1.0 g/kg bw) Protein  Carbohydrate:  At Least 130 g/day  Fluids: 2000 mL/day (1ml/kcal)    NUTRITION DIAGNOSES:   Problem:  No nutritional diagnosis at this time      Etiology: related to       Signs/Symptoms: as evidenced by        NUTRITION INTERVENTIONS:  Meals/Snacks: General/healthful diet                  GOAL:   PO intake at least 50% of meals next 5-7 days    LEARNING NEEDS (Diet, Food/Nutrient-Drug Interaction):    [x] None Identified   [] Identified and Education Provided/Documented   [] Identified and Pt declined/was not appropriate     Cultureal, Sabianism, OR Ethnic Dietary Needs:    [x] None Identified   [] Identified and Addressed     [x] Interdisciplinary Care Plan Reviewed/Documented    [x] Discharge Planning:  Continue regular diet as appropriate     MONITORING /EVALUATION:   Food/Nutrient Intake Outcomes:  Total energy intake  Physical Signs/Symptoms Outcomes: Weight/weight change    NUTRITION RISK:    [] Patient At Nutritional Risk    [] High              [] Moderate/Mild           []  Low     [x] Patient Not At Nutritional Risk    PT SEEN FOR:    [x]  MD Consult: []Calorie Count      []Diabetic Diet Education        []Diet Education     []Electrolyte Management     [x]General Nutrition Management and Supplements     []Management of Tube Feeding     []TPN Recommendations    []  RN Referral:  []MST score >=2     []Enteral/Parenteral Nutrition PTA     []Pregnant: Gestational DM or Multigestation     []Pressure Ulcer/Wound Care needs        []  Low BMI  []  KAT Jerome, 67 [address]  Pager [phone]  Weekend Pager [phone]

## 2018-02-06 NOTE — PROGRESS NOTES
Problem: Falls - Risk of  Goal: *Absence of Falls  Document [place] Fall Risk and appropriate interventions in the flowsheet.    Outcome: Progressing Towards Goal  Fall Risk Interventions:            Medication Interventions: Patient to call before getting OOB    Elimination Interventions: Call light in reach, Patient to call for help with toileting needs

## 2018-02-06 NOTE — PROGRESS NOTES
Post-Partum Day Number 1 Progress Note    Maryan Carey     Assessment: Post partum day 1, c/o worsening flank pain, not responsive to oxycodone, but previously well controlled on dilaudid. Her antepartum course is complicated by admission 6 days ago for flank pain related to bilateral hydronephrosis. She had seen Dr. Lulu Jason early in pregnancy for this and was seen during this hospitalization by Dr. Romina Chen. Both of these urologists agreed that her hydronephrosis was likely mechanical related to her gravid uterus and that sx should improve after delivery. She also believes she has more chronic kidney problems and reports \"some kind of spongy cells in my kidneys\" which preclude the use of NSAIDs, although I cannot find any report of parenchymal disease in her history. She states she never had a biopsy, but this was all based on imaging. Also, her renal function has remained normal.      Plan:  1. Pain only responsive to dilaudid, will switch to this. Index of suspicion for drug seeking behavior is high, but difficult to delineate in face of recent history. The inability to take NSAIDs complicates her pain management as well as some inflammatory pain related simply to delivery is probable. 2.  Will seek urology's opinion on her ability to take NSAIDs, because this is the only origin of her report of an ibuprofen \"allergy. \"    3. As pain is worsening instead of improving, will go ahead and reimage kidneys today, recheck BMP and also UA/CBC to evaluate possibility of infection. Information for the patient's :  Rafael Andres [[id number]]   Vaginal, Spontaneous Delivery   Patient doing well from OB standpoint. Connors in place, normal lochia    Vitals:  Visit Vitals    /65 (BP 1 Location: Left arm, BP Patient Position: Lying right side; Head of bed elevated (Comment degrees))    Pulse (!) 55    Temp 98 °F (36.7 °C)    Resp 16    Ht 5' 3\" (1.6 m)    Wt 82.8 kg (182 lb 8.7 oz)    SpO2 94%    Breastfeeding Yes    BMI 32.34 kg/m2     Temp (24hrs), Av °F (36.7 °C), Min:97.9 °F (36.6 °C), Max:98.1 °F (36.7 °C)        Exam:   Patient without distress. Abdomen soft, fundus firm, nontender                Bilateral CVAT, appears mild                Perineum with normal lochia noted. Lower extremities are negative for swelling, cords or tenderness.     Labs:     Lab Results   Component Value Date/Time    WBC 9.6 2018 05:00 PM    WBC 9.0 2018 10:38 PM    WBC 16.9 2017 05:00 PM    WBC 8.4 10/05/2017 09:00 PM    WBC 8.4 2017 05:54 PM    WBC 9.9 2016 09:29 PM    WBC 10.3 2016 01:26 AM    WBC 5.6 2016 02:32 PM    WBC 8.0 2015 09:11 PM    WBC 6.8 2015 03:44 AM    WBC 9.6 2015 05:45 PM    WBC 7.4 2015 06:20 PM    WBC 10.6 2015 06:45 PM    WBC 8.5 2015 11:40 AM    WBC 9.9 2014 09:45 PM    WBC 6.3 2014 01:35 AM    WBC 9.9 2014 04:50 PM    WBC 9.6 2014 05:55 AM    WBC 12.7 10/26/2010 06:00 PM    WBC 12.6 2010 03:54 AM    HGB 10.2 2018 05:00 PM    HGB 10.0 2018 10:38 PM    HGB 10.7 2017 05:00 PM    HGB 11.0 10/05/2017 09:00 PM    HGB 11.1 2017 05:54 PM    HGB 11.8 2016 09:29 PM    HGB 13.2 2016 01:26 AM    HGB 13.0 2016 02:32 PM    HGB 12.3 2015 09:11 PM    HGB 10.6 2015 03:44 AM    HGB 9.6 2015 05:45 PM    HGB 9.2 2015 06:20 PM    HGB 10.1 2015 06:45 PM    HGB 9.1 2015 11:40 AM    HGB 10.7 2014 09:45 PM    HGB 11.5 2014 01:35 AM    HGB 13.3 2014 04:50 PM    HGB 13.8 2014 05:55 AM    HGB 10.0 10/26/2010 06:00 PM    HGB 11.4 2010 03:54 AM    HCT 30.4 2018 05:00 PM    HCT 29.7 2018 10:38 PM    HCT 30.4 2017 05:00 PM    HCT 31.7 10/05/2017 09:00 PM    HCT 32.1 2017 05:54 PM    HCT 35.0 2016 09:29 PM    HCT 38.6 2016 01:26 AM    HCT 39.0 04/19/2016 02:32 PM    HCT 37.8 11/06/2015 09:11 PM    HCT 32.2 05/04/2015 03:44 AM    HCT 29.2 04/08/2015 05:45 PM    HCT 27.2 04/06/2015 06:20 PM    HCT 30.7 04/05/2015 06:45 PM    HCT 27.8 01/05/2015 11:40 AM    HCT 31.1 11/07/2014 09:45 PM    HCT 33.9 09/08/2014 01:35 AM    HCT 40.4 07/23/2014 04:50 PM    HCT 41.2 05/07/2014 05:55 AM    HCT 30.4 10/26/2010 06:00 PM    HCT 33.5 05/27/2010 03:54 AM    PLATELET 068 92/50/5840 05:00 PM    PLATELET 321 29/52/0739 10:38 PM    PLATELET 542 53/88/8970 05:00 PM    PLATELET 096 54/85/8167 09:00 PM    PLATELET 036 40/58/8782 05:54 PM    PLATELET 428 00/61/0163 09:29 PM    PLATELET 953 08/69/3999 01:26 AM    PLATELET 954 98/16/8642 02:32 PM    PLATELET 973 83/16/4601 09:11 PM    PLATELET 557 80/26/1552 03:44 AM    PLATELET 972 90/85/7928 05:45 PM    PLATELET 176 00/11/6266 06:20 PM    PLATELET 356 96/72/0314 06:45 PM    PLATELET 918 39/28/9312 11:40 AM    PLATELET 435 02/23/0587 09:45 PM    PLATELET 292 62/95/8242 01:35 AM    PLATELET 808 30/27/3463 04:50 PM    PLATELET 956 54/35/7593 05:55 AM    PLATELET 576 34/30/6435 06:00 PM    PLATELET 163 30/18/4043 03:54 AM       No results found for this or any previous visit (from the past 24 hour(s)).

## 2018-02-06 NOTE — PROGRESS NOTES
Reviewed case over the phone with Dr. Sharri Hinojosa, who is the urologist who most recently consulted on pt. She has a report in her imagine of nephrocalcinosis, but this is not a contraindication to NSAIDs.   As her renal function has been normal, will plan to begin scheduled motrin as well, if her BUN/Cr prove to remain normal.

## 2018-02-06 NOTE — LACTATION NOTE
This note was copied from a baby's chart. p4  37 1/7 weeks  IOL for Chronic Kidney disease. Mother just changed to Roxicodone for pain relief. @ 16 hours of life, am wt assessed at -2%  3 breastfeedings, mother elected to start formula due to her kidney discomfort as well as after birth cramping. 55 ml total. 3 wet 1 stool. She breast fed her last 2 children for 2 weeks, recalling back to work issues. She purchased her own Evenflow pump, unaware of insurance breast pump benefit, encouraged to call and start process to obtain. States she can go back to work on her own schedule this time. Reviewed basics of 1st weeks of nursing. Bedside I/0 record initiated and updated. Reviewed daily weights and recording feedings. Importance of knowing how your baby is getting enough through I/0 and daily wts. Breasts may become engorged when milk \"comes in\". How milk is made / normal phases of milk production, supply and demand discussed. Taught care of engorged breasts - frequent breastfeeding encouraged, warm compresses and breast massage ac. Then nurse the baby or pump. Apply cold compresses pc x 15 minutes a few times a day for swelling or discomfort. May need to do this care for a couple of days. Large breast, gloria nipple. Left breast lump in upper outer quadrant, was scheduled for an ultrasound last week but was admitted for observation. Anticipates follow up post partum asap. Taught hand expression / compression to empty breast well. Call prn.    [facility] # provided.

## 2018-02-06 NOTE — PROGRESS NOTES
Spoke with dr Giblert Harper about pt's pain being uncontrolled.   New order will be placed for pain meds by md

## 2018-02-06 NOTE — PROGRESS NOTES
Bedside and Verbal shift change report given to HETAL Wong RN  (oncoming nurse) by LORETTA Nguyen (offgoing nurse). Report included the following information SBAR, Kardex, Procedure Summary, Intake/Output, MAR and Recent Results.

## 2018-02-07 VITALS
HEIGHT: 63 IN | HEART RATE: 62 BPM | WEIGHT: 182.54 LBS | BODY MASS INDEX: 32.34 KG/M2 | TEMPERATURE: 98.2 F | SYSTOLIC BLOOD PRESSURE: 111 MMHG | RESPIRATION RATE: 16 BRPM | DIASTOLIC BLOOD PRESSURE: 65 MMHG | OXYGEN SATURATION: 94 %

## 2018-02-07 PROBLEM — O23.43 UTI IN PREGNANCY, ANTEPARTUM, THIRD TRIMESTER: Status: RESOLVED | Noted: 2018-01-30 | Resolved: 2018-02-07

## 2018-02-07 PROCEDURE — 74011250637 HC RX REV CODE- 250/637: Performed by: OBSTETRICS & GYNECOLOGY

## 2018-02-07 RX ORDER — HYDROMORPHONE HYDROCHLORIDE 4 MG/1
4 TABLET ORAL
Qty: 20 TAB | Refills: 0 | Status: SHIPPED | OUTPATIENT
Start: 2018-02-07 | End: 2018-08-05

## 2018-02-07 RX ORDER — IBUPROFEN 600 MG/1
600 TABLET ORAL
Qty: 30 TAB | Refills: 0 | Status: SHIPPED | OUTPATIENT
Start: 2018-02-07 | End: 2018-10-05

## 2018-02-07 RX ORDER — NITROFURANTOIN 25; 75 MG/1; MG/1
100 CAPSULE ORAL EVERY 12 HOURS
Qty: 8 CAP | Refills: 0 | Status: SHIPPED | OUTPATIENT
Start: 2018-02-07 | End: 2018-02-11

## 2018-02-07 RX ADMIN — HYDROMORPHONE HYDROCHLORIDE 4 MG: 2 TABLET ORAL at 02:50

## 2018-02-07 RX ADMIN — HYDROMORPHONE HYDROCHLORIDE 4 MG: 2 TABLET ORAL at 12:08

## 2018-02-07 RX ADMIN — Medication 1 TABLET: at 09:14

## 2018-02-07 RX ADMIN — IBUPROFEN 800 MG: 400 TABLET, FILM COATED ORAL at 00:27

## 2018-02-07 RX ADMIN — IBUPROFEN 800 MG: 400 TABLET, FILM COATED ORAL at 09:13

## 2018-02-07 RX ADMIN — HYDROMORPHONE HYDROCHLORIDE 4 MG: 2 TABLET ORAL at 07:54

## 2018-02-07 RX ADMIN — PHENAZOPYRIDINE HYDROCHLORIDE 200 MG: 100 TABLET ORAL at 09:14

## 2018-02-07 RX ADMIN — DOCUSATE SODIUM 100 MG: 100 CAPSULE, LIQUID FILLED ORAL at 12:07

## 2018-02-07 RX ADMIN — NITROFURANTOIN (MONOHYDRATE/MACROCRYSTALS) 100 MG: 75; 25 CAPSULE ORAL at 09:13

## 2018-02-07 NOTE — DISCHARGE SUMMARY
Obstetrical Discharge Summary     Name: Dillan Short MRN: [id number]  SSN: [id number]    YOB: 1990  Age: 33 y.o. Sex: female      Admit Date: 2018    Discharge Date: 2018     Admitting Physician: Emmanuel Alas MD     Attending Physician:  Emmanuel Alas MD     Admission Diagnoses: back pain/pelvic pressure  Abdominal pressure  UTI in pregnancy, antepartum, third trimester  Flank pain    Discharge Diagnoses:   Information for the patient's :  Rachell Russo [[id number]]   Delivery of a 2.895 kg female infant via Vaginal, Spontaneous Delivery on 2018 at 3:10 PM  by . Apgars were 8 and 9. Additional Diagnoses:   Hospital Problems  Date Reviewed: 2015          Codes Class Noted POA    Flank pain ICD-10-CM: R10.9  ICD-9-CM: 789.09  2018 Unknown        Abdominal pressure ICD-10-CM: R10.9  ICD-9-CM: 789.00  2018 Unknown             Lab Results   Component Value Date/Time    Rubella, External Immune 8.88 2017    GrBStrep, External neg 2015       Hospital Course: Patient was admitted at 39 1/2 weeks with flank pain, UTI and urinary retention. Found to have bilateral hydronephrosis and persistent urinary retention that urology felt was due to obstruction with gravid uterus. [facility] recommended induction at 37 weeks due to urinary retention. She was treated for 48 hours with Vancomycin and then changed to po Macrobid. Delivery uncomplicated. Pain improved after  delivery and urinary retention resolved. Normal hospital course following the delivery. Disposition at Discharge: Home or self care    Discharged Condition: Stable    Patient Instructions:   Current Discharge Medication List      START taking these medications    Details   HYDROmorphone (DILAUDID) 4 mg tablet Take 1 Tab by mouth every four (4) hours as needed. Max Daily Amount: 24 mg.   Qty: 20 Tab, Refills: 0    Associated Diagnoses: Acute flank pain      nitrofurantoin, macrocrystal-monohydrate, (MACROBID) 100 mg capsule Take 1 Cap by mouth every twelve (12) hours for 4 days. Qty: 8 Cap, Refills: 0      ibuprofen (MOTRIN) 600 mg tablet Take 1 Tab by mouth every six (6) hours as needed for Pain for up to 360 days. Qty: 30 Tab, Refills: 0         CONTINUE these medications which have NOT CHANGED    Details   PRENATAL VIT CALC,IRON,FOLIC (PRENATAL VITAMIN PO) Take  by mouth. ondansetron (ZOFRAN ODT) 4 mg disintegrating tablet Take 1 Tab by mouth every eight (8) hours as needed for Nausea. Qty: 20 Tab, Refills: 0      cephALEXin (KEFLEX) 500 mg capsule Take 1 Cap by mouth three (3) times daily. Qty: 15 Cap, Refills: 0             Reference my discharge instructions.     Follow-up Appointments   Procedures    FOLLOW UP VISIT Appointment in: 3 - 5 Days With Dr Lesly Eckert for urine test of cure and 6 weeks for postpartum check With [facility] in 1-2 weeks     With Dr Lesly Eckert for urine test of cure and 6 weeks for postpartum check  With [facility] in 1-2 weeks     Standing Status:   Standing     Number of Occurrences:   1     Order Specific Question:   Appointment in     Answer:   3 - 5 Days        Signed By:  Maxime Morse MD     February 7, 2018

## 2018-02-07 NOTE — PROGRESS NOTES
Post-Partum Day Number 2 Progress Note    Hector      Assessment: Doing well, post partum day 2- pain much better, voiding without difficulty    Plan:   1. Discharge home today  2. Follow up in office in 1 week with Dr. Shay Sol for urine culture and 6 weeks for postpartum visit  3. Post partum activity advised, diet as tolerated  4. Discharge Medications: ibuprofen, dilaudid and medications prior to admission  5. Hydronephrosis resolved- follow up with [place] [facility] in 1-2 weeks  6. UTI- will send home with Macrobid BID x 4 days to complete 10 day course, urine JOSE early next week    Information for the patient's :  Danyell Blood [[id number]]   Vaginal, Spontaneous Delivery   Patient doing well without significant complaint. Voiding without difficulty, normal lochia. Vitals:  Visit Vitals    /65    Pulse 62    Temp 98.2 °F (36.8 °C)    Resp 16    Ht 5' 3\" (1.6 m)    Wt 82.8 kg (182 lb 8.7 oz)    SpO2 94%    Breastfeeding Yes    BMI 32.34 kg/m2     Temp (24hrs), Av.2 °F (36.8 °C), Min:98.1 °F (36.7 °C), Max:98.3 °F (36.8 °C)      Exam:         Patient without distress. Abdomen soft, fundus firm, nontender                 Lower extremities are negative for swelling, cords or tenderness.     Labs:     Lab Results   Component Value Date/Time    WBC 10.1 2018 12:46 PM    WBC 9.6 2018 05:00 PM    WBC 9.0 2018 10:38 PM    WBC 16.9 (H) 2017 05:00 PM    WBC 8.4 10/05/2017 09:00 PM    WBC 8.4 2017 05:54 PM    WBC 9.9 2016 09:29 PM    WBC 10.3 2016 01:26 AM    WBC 5.6 2016 02:32 PM    WBC 8.0 2015 09:11 PM    WBC 6.8 2015 03:44 AM    WBC 9.6 2015 05:45 PM    WBC 7.4 2015 06:20 PM    WBC 10.6 2015 06:45 PM    WBC 8.5 2015 11:40 AM    WBC 9.9 2014 09:45 PM    WBC 6.3 2014 01:35 AM    WBC 9.9 2014 04:50 PM    WBC 9.6 2014 05:55 AM    WBC 12.7 (H) 10/26/2010 06:00 PM WBC 12.6 (H) 05/27/2010 03:54 AM    HGB 10.1 (L) 02/06/2018 12:46 PM    HGB 10.2 (L) 02/04/2018 05:00 PM    HGB 10.0 (L) 01/30/2018 10:38 PM    HGB 10.7 (L) 11/26/2017 05:00 PM    HGB 11.0 (L) 10/05/2017 09:00 PM    HGB 11.1 (L) 09/20/2017 05:54 PM    HGB 11.8 05/12/2016 09:29 PM    HGB 13.2 05/03/2016 01:26 AM    HGB 13.0 04/19/2016 02:32 PM    HGB 12.3 11/06/2015 09:11 PM    HGB 10.6 (L) 05/04/2015 03:44 AM    HGB 9.6 (L) 04/08/2015 05:45 PM    HGB 9.2 (L) 04/06/2015 06:20 PM    HGB 10.1 (L) 04/05/2015 06:45 PM    HGB 9.1 (L) 01/05/2015 11:40 AM    HGB 10.7 (L) 11/07/2014 09:45 PM    HGB 11.5 09/08/2014 01:35 AM    HGB 13.3 07/23/2014 04:50 PM    HGB 13.8 05/07/2014 05:55 AM    HGB 10.0 (L) 10/26/2010 06:00 PM    HGB 11.4 (L) 05/27/2010 03:54 AM    HCT 30.1 (L) 02/06/2018 12:46 PM    HCT 30.4 (L) 02/04/2018 05:00 PM    HCT 29.7 (L) 01/30/2018 10:38 PM    HCT 30.4 (L) 11/26/2017 05:00 PM    HCT 31.7 (L) 10/05/2017 09:00 PM    HCT 32.1 (L) 09/20/2017 05:54 PM    HCT 35.0 05/12/2016 09:29 PM    HCT 38.6 05/03/2016 01:26 AM    HCT 39.0 04/19/2016 02:32 PM    HCT 37.8 11/06/2015 09:11 PM    HCT 32.2 (L) 05/04/2015 03:44 AM    HCT 29.2 (L) 04/08/2015 05:45 PM    HCT 27.2 (L) 04/06/2015 06:20 PM    HCT 30.7 (L) 04/05/2015 06:45 PM    HCT 27.8 (L) 01/05/2015 11:40 AM    HCT 31.1 (L) 11/07/2014 09:45 PM    HCT 33.9 (L) 09/08/2014 01:35 AM    HCT 40.4 07/23/2014 04:50 PM    HCT 41.2 05/07/2014 05:55 AM    HCT 30.4 (L) 10/26/2010 06:00 PM    HCT 33.5 (L) 05/27/2010 03:54 AM    PLATELET 692 67/15/1442 12:46 PM    PLATELET 780 64/98/5593 05:00 PM    PLATELET 497 37/73/2059 10:38 PM    PLATELET 893 14/67/2222 05:00 PM    PLATELET 386 29/68/3656 09:00 PM    PLATELET 415 66/92/9595 05:54 PM    PLATELET 857 72/46/2843 09:29 PM    PLATELET 397 15/10/8835 01:26 AM    PLATELET 762 46/98/1548 02:32 PM    PLATELET 469 61/17/4194 09:11 PM    PLATELET 537 80/12/2140 03:44 AM    PLATELET 172 21/90/8742 05:45 PM    PLATELET 903 (L) 04/06/2015 06:20 PM    PLATELET 205 47/35/0045 06:45 PM    PLATELET 624 58/64/0999 11:40 AM    PLATELET 891 06/92/6878 09:45 PM    PLATELET 504 41/50/4159 01:35 AM    PLATELET 990 71/18/5298 04:50 PM    PLATELET 531 13/89/6050 05:55 AM    PLATELET 533 05/48/3934 06:00 PM    PLATELET 410 71/92/6959 03:54 AM       Recent Results (from the past 24 hour(s))   CBC WITH AUTOMATED DIFF    Collection Time: 02/06/18 12:46 PM   Result Value Ref Range    WBC 10.1 3.6 - 11.0 K/uL    RBC 3.37 (L) 3.80 - 5.20 M/uL    HGB 10.1 (L) 11.5 - 16.0 g/dL    HCT 30.1 (L) 35.0 - 47.0 %    MCV 89.3 80.0 - 99.0 FL    MCH 30.0 26.0 - 34.0 PG    MCHC 33.6 30.0 - 36.5 g/dL    RDW 14.2 11.5 - 14.5 %    PLATELET 481 134 - 472 K/uL    MPV 9.7 8.9 - 12.9 FL    NRBC 0.0 0  WBC    ABSOLUTE NRBC 0.00 0.00 - 0.01 K/uL    NEUTROPHILS 75 32 - 75 %    LYMPHOCYTES 17 12 - 49 %    MONOCYTES 5 5 - 13 %    EOSINOPHILS 2 0 - 7 %    BASOPHILS 1 0 - 1 %    IMMATURE GRANULOCYTES 1 (H) 0.0 - 0.5 %    ABS. NEUTROPHILS 7.6 1.8 - 8.0 K/UL    ABS. LYMPHOCYTES 1.7 0.8 - 3.5 K/UL    ABS. MONOCYTES 0.5 0.0 - 1.0 K/UL    ABS. EOSINOPHILS 0.2 0.0 - 0.4 K/UL    ABS. BASOPHILS 0.1 0.0 - 0.1 K/UL    ABS. IMM.  GRANS. 0.1 (H) 0.00 - 0.04 K/UL    DF AUTOMATED     METABOLIC PANEL, BASIC    Collection Time: 02/06/18 12:46 PM   Result Value Ref Range    Sodium 140 136 - 145 mmol/L    Potassium 3.8 3.5 - 5.1 mmol/L    Chloride 108 97 - 108 mmol/L    CO2 24 21 - 32 mmol/L    Anion gap 8 5 - 15 mmol/L    Glucose 121 (H) 65 - 100 mg/dL    BUN 8 6 - 20 MG/DL    Creatinine 0.59 0.55 - 1.02 MG/DL    BUN/Creatinine ratio 14 12 - 20      GFR est AA >60 >60 ml/min/1.73m2    GFR est non-AA >60 >60 ml/min/1.73m2    Calcium 8.9 8.5 - 10.1 MG/DL   URINALYSIS W/MICROSCOPIC    Collection Time: 02/06/18  2:13 PM   Result Value Ref Range    Color RACHELLE      Appearance CLOUDY (A) CLEAR      Specific gravity 1.025 1.003 - 1.030      pH (UA) 6.5 5.0 - 8.0      Protein TRACE (A) NEG mg/dL Glucose NEGATIVE  NEG mg/dL    Ketone 15 (A) NEG mg/dL    Blood NEGATIVE  NEG      Urobilinogen 4.0 (H) 0.2 - 1.0 EU/dL    Nitrites POSITIVE (A) NEG      Leukocyte Esterase MODERATE (A) NEG      WBC 0-4 0 - 4 /hpf    RBC 10-20 0 - 5 /hpf    Epithelial cells FEW FEW /lpf    Bacteria NEGATIVE  NEG /hpf   BILIRUBIN, CONFIRM    Collection Time: 02/06/18  2:13 PM   Result Value Ref Range    Bilirubin UA, confirm POSITIVE (A) NEG

## 2018-02-07 NOTE — ROUTINE PROCESS
Bedside shift change report given to MAURIZIO Jimenez RN (oncoming nurse) by SANDI Elaine RN (offgoing nurse). Report included the following information SBAR.

## 2018-02-07 NOTE — DISCHARGE INSTRUCTIONS
POST DELIVERY DISCHARGE INSTRUCTIONS    Name: Vonnie Grurola  YOB: 1990  Primary Diagnosis: Active Problems:    Abdominal pressure (2018)      Flank pain (2018)        General:     Diet/Diet Restrictions:  · Eight 8-ounce glasses of fluid daily (water, juices); avoid excessive caffeine intake. · Meals/snacks as desired which are high in fiber and carbohydrates and low in fat and cholesterol. Medications:         Physical Activity / Restrictions / Safety:     · Avoid heavy lifting, no more that 8 lbs. For 2-3 weeks;   · Limit use of stairs to 2 times daily for the first week home. · No driving for one week. · Avoid intercourse 4-6 weeks, no douching or tampon use. · Check with obstetrician before starting or resuming an exercise program.      Discharge Instructions/Special Treatment/Home Care Needs:     · Continue prenatal vitamins. · Continue to use squirt bottle with warm water on your episiotomy after each bathroom use until bleeding stops. · If steri-strips applied to your incision, remove in 7-10 days. Call your doctor for the following:     · Fever over 101 degrees by mouth. · Vaginal bleeding heavier than a normal menstrual period or clots larger than a golf ball. · Red streaks or increased swelling of legs, painful red streaks on your breast.  · Painful urination, constipation and increased pain or swelling or discharge with your incision. · If you feel extremely anxious or overwhelmed. · If you have thoughts of harming yourself and/or your baby. Pain Management:     · Take Acetaminophen (Tylenol) or Ibuprofen (Advil, Motrin), as directed for pain. · Use a warm Sitz bath 3 times daily to relieve episiotomy or hemorrhoidal discomfort. · For hemorrhoidal discomfort, use Tucks and Anusol cream as needed and directed. · Heating pad to  incision as needed.      Follow-Up Care:     Appointment with MD:   Follow-up Appointments   Procedures    FOLLOW UP VISIT Appointment in: 3 - 5 Days With Dr Yesenia Barlow for urine test of cure and 6 weeks for [facility]     With Dr Yesenia Barlow for urine test of cure and 6 weeks for postpartum check  With [facility] in 1-2 weeks     Standing Status:   Standing     Number of Occurrences:   1     Order Specific Question:   Appointment in     Answer:   3 - 5 Days     Telephone number: [phone]    Signed By: Litzy Peoples MD                                                                                                   Date: 2018 Time: 9:31 AM    [facility] Activation    Thank you for requesting access to [address] Please follow the instructions below to securely access and download your online medical record. [employer] allows you to send messages to your doctor, view your test results, renew your prescriptions, schedule appointments, and more. How Do I Sign Up? 1. In your internet browser, go to [email or web address]  2. Click on the First Time User? Click Here link in the Sign In box. You will be redirect to the New Member Sign Up page. 3. Enter your [employer] Access Code exactly as it appears below. You will not need to use this code after youve completed the sign-up process. If you do not sign up before the expiration date, you must request a new code. [employer] Access Code: [email or web address]  Expires: 2018 12:14 PM (This is the date your [employer] access code will )    4. Enter the last four digits of your Social Security Number (xxxx) and Date of Birth (mm/dd/yyyy) as indicated and click Submit. You will be taken to the next sign-up page. 5. Create a [employer] ID. This will be your [employer] login ID and cannot be changed, so think of one that is secure and easy to remember. 6. Create a [employer] password. You can change your password at any time. 7. Enter your Password Reset Question and Answer. This can be used at a later time if you forget your password.    8. Enter your e-mail address. You will receive e-mail notification when new information is available in [address] 9. Click Sign Up. You can now view and download portions of your medical record. 10. Click the Download Summary menu link to download a portable copy of your medical information. Additional Information    If you have questions, please visit the Frequently Asked Questions section of the [employer] website at [email or web address] Remember, [employer] is NOT to be used for urgent needs. For medical emergencies, dial 911.

## 2018-02-07 NOTE — PROGRESS NOTES
Discharge instructions given. Prescriptions given. [facility] agreement reviewed and signed. Questions answered. Verbalized understanding. Discharged to [facility].

## 2020-11-05 NOTE — ED TRIAGE NOTES
Dr Reno Villalpando in to see patient
Talked with L & D & will send patient upstairs
How Severe Is Your Acne?: moderate
Is This A New Presentation, Or A Follow-Up?: Acne
Additional Comments (Use Complete Sentences): Patient uses skin care products with retinol, vitamin C and glycolic acid.

## 2021-06-18 ENCOUNTER — TELEPHONE (OUTPATIENT)
Dept: FAMILY MEDICINE CLINIC | Age: 31
End: 2021-06-18

## 2021-06-18 RX ORDER — METHOCARBAMOL 500 MG/1
500 TABLET, FILM COATED ORAL
Qty: 90 TABLET | Refills: 5 | Status: SHIPPED | OUTPATIENT
Start: 2021-06-18

## 2021-06-18 RX ORDER — FLUOXETINE 10 MG/1
10 CAPSULE ORAL DAILY
Qty: 30 CAPSULE | Refills: 5 | Status: SHIPPED | OUTPATIENT
Start: 2021-06-18

## 2021-06-18 RX ORDER — PERPHENAZINE 4 MG/1
4 TABLET, FILM COATED ORAL
Qty: 30 TABLET | Refills: 5 | Status: SHIPPED | OUTPATIENT
Start: 2021-06-18

## 2021-06-18 RX ORDER — DIPHENHYDRAMINE HCL 25 MG
50 TABLET ORAL
Qty: 60 TABLET | Refills: 5 | Status: SHIPPED | OUTPATIENT
Start: 2021-06-18

## 2021-06-18 NOTE — PROGRESS NOTES
[facility] patient. Patient with need for renewal of regular medications. Ordered. 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 That is not an available treatment at our local halfway. We will maintain her regular medications, and work on transferring her to another facility that has substance abuse medically assisted treatment available in house.

## 2022-11-29 ENCOUNTER — HOSPITAL ENCOUNTER (EMERGENCY)
Age: 32
Discharge: HOME | End: 2022-11-29
Attending: EMERGENCY MEDICINE | Admitting: EMERGENCY MEDICINE
Payer: MEDICAID

## 2022-11-29 VITALS
OXYGEN SATURATION: 99 % | HEART RATE: 62 BPM | BODY MASS INDEX: 31.01 KG/M2 | DIASTOLIC BLOOD PRESSURE: 77 MMHG | WEIGHT: 175 LBS | HEIGHT: 63 IN | TEMPERATURE: 97.5 F | SYSTOLIC BLOOD PRESSURE: 137 MMHG | RESPIRATION RATE: 16 BRPM

## 2022-11-29 DIAGNOSIS — J06.9 ACUTE UPPER RESPIRATORY INFECTION: Primary | ICD-10-CM

## 2022-11-29 LAB — DEPRECATED S PYO AG THROAT QL EIA: NEGATIVE

## 2022-11-29 PROCEDURE — 99283 EMERGENCY DEPT VISIT LOW MDM: CPT

## 2022-11-29 PROCEDURE — 87880 STREP A ASSAY W/OPTIC: CPT

## 2022-11-29 PROCEDURE — 87070 CULTURE OTHR SPECIMN AEROBIC: CPT

## 2022-11-29 RX ORDER — PREDNISONE 20 MG/1
60 TABLET ORAL DAILY
Qty: 15 TABLET | Refills: 0 | Status: SHIPPED | OUTPATIENT
Start: 2022-11-29 | End: 2022-11-29 | Stop reason: SDUPTHER

## 2022-11-29 RX ORDER — AMOXICILLIN 875 MG/1
875 TABLET, FILM COATED ORAL 2 TIMES DAILY
Qty: 14 TABLET | Refills: 0 | Status: SHIPPED | OUTPATIENT
Start: 2022-11-29 | End: 2022-11-29 | Stop reason: SDUPTHER

## 2022-11-29 RX ORDER — PREDNISONE 20 MG/1
60 TABLET ORAL
Status: DISCONTINUED | OUTPATIENT
Start: 2022-11-29 | End: 2022-11-29 | Stop reason: HOSPADM

## 2022-11-29 RX ORDER — PREDNISONE 20 MG/1
60 TABLET ORAL DAILY
Qty: 15 TABLET | Refills: 0 | Status: SHIPPED | OUTPATIENT
Start: 2022-11-29 | End: 2022-12-04

## 2022-11-29 RX ORDER — AMOXICILLIN 875 MG/1
875 TABLET, FILM COATED ORAL 2 TIMES DAILY
Qty: 14 TABLET | Refills: 0 | Status: SHIPPED | OUTPATIENT
Start: 2022-11-29 | End: 2022-12-06

## 2022-11-29 NOTE — ED PROVIDER NOTES
EMERGENCY DEPARTMENT HISTORY AND PHYSICAL EXAM      Date: 11/29/2022  Patient Name: Steve Patel    History of Presenting Illness     Chief Complaint   Patient presents with    Sore Throat       History Provided By: Patient    HPI: Steve Patel, 33 y.o. female presents to the ED with cc of URI symptoms. Patient presents to the emerged part by private vehicle for evaluation of URI. She states that she has been ill for the past 3 to 4 days. She states she has had nasal congestion with postnasal drip. She states that she has also had a dry nonproductive cough. She states that today she began having moderate throat pain worse with swallowing. She denies any fever or chills. She denies any chest pain. She denies any abdominal pain nausea vomiting diarrhea. She states her daughter has the same symptoms. There are no other complaints, changes, or physical findings at this time. PCP: Michael Shelton., MD    No current facility-administered medications on file prior to encounter. Current Outpatient Medications on File Prior to Encounter   Medication Sig Dispense Refill    perphenazine (TRILAFON) 4 mg tablet Take 1 Tablet by mouth nightly. 30 Tablet 5    FLUoxetine (PROzac) 10 mg capsule Take 1 Capsule by mouth daily. 30 Capsule 5    diphenhydrAMINE (Benadryl Allergy) 25 mg tablet Take 2 Tablets by mouth nightly. 60 Tablet 5    methocarbamoL (ROBAXIN) 500 mg tablet Take 1 Tablet by mouth three (3) times daily as needed for Muscle Spasm(s). 90 Tablet 5    clindamycin (CLEOCIN) 300 mg capsule Take 1 Cap by mouth three (3) times daily. 21 Cap 0    HYDROcodone-acetaminophen (NORCO) 5-325 mg per tablet Take 1 Tab by mouth every six (6) hours as needed for Pain. Max Daily Amount: 4 Tabs. 6 Tab 0    PRENATAL VIT CALC,IRON,FOLIC (PRENATAL VITAMIN PO) Take  by mouth. ondansetron (ZOFRAN ODT) 4 mg disintegrating tablet Take 1 Tab by mouth every eight (8) hours as needed for Nausea.  20 Tab 0       Past History     Past Medical History:  Past Medical History:   Diagnosis Date    Abnormal Pap smear     chlamydia at beginning of preg; treated at that time    Chronic kidney disease     She thinks she has CKD or \"small kidneys\"     HX OTHER MEDICAL      x2    Pyelonephritis 2015    Rhesus isoimmunization unspecified as to episode of care in pregnancy     o negative       Past Surgical History:  Past Surgical History:   Procedure Laterality Date    HX APPENDECTOMY      HX CHOLECYSTECTOMY         Family History:  Family History   Problem Relation Age of Onset    Hypertension Mother     Stroke Mother     Diabetes Father     Stroke Father        Social History:  Social History     Tobacco Use    Smoking status: Every Day     Packs/day: 0.25     Years: 2.00     Pack years: 0.50     Types: Cigarettes    Smokeless tobacco: Never    Tobacco comments:     nicotine patch, education on smoking and dental problems   Substance Use Topics    Alcohol use: No     Comment: occ    Drug use: No       Allergies: Allergies   Allergen Reactions    Morphine Other (comments)     Causes muscle aches    Nsaids (Non-Steroidal Anti-Inflammatory Drug) Other (comments)     Pt reports kidney issues    Tramadol Seizures         Review of Systems   Review of Systems   Constitutional: Negative. Negative for appetite change, chills and fever. HENT:  Positive for congestion, postnasal drip and sore throat. Negative for ear pain. Eyes: Negative. Respiratory:  Positive for cough. Negative for shortness of breath. Cardiovascular: Negative. Gastrointestinal: Negative. Negative for diarrhea, nausea and vomiting. Musculoskeletal: Negative. Skin: Negative. Neurological: Negative. Negative for headaches. Physical Exam   Physical Exam  Vitals and nursing note reviewed. Constitutional:       General: She is not in acute distress. Appearance: She is well-developed. She is not diaphoretic.    HENT:      Head: Normocephalic and atraumatic. Right Ear: Tympanic membrane normal.      Left Ear: Tympanic membrane normal.      Nose: Congestion present. Mouth/Throat:      Mouth: Mucous membranes are moist.      Pharynx: Posterior oropharyngeal erythema present. No oropharyngeal exudate. Tonsils: No tonsillar exudate or tonsillar abscesses. Eyes:      Conjunctiva/sclera: Conjunctivae normal.      Pupils: Pupils are equal, round, and reactive to light. Neck:      Vascular: No JVD. Trachea: No tracheal deviation. Cardiovascular:      Rate and Rhythm: Normal rate and regular rhythm. Heart sounds: Normal heart sounds. No murmur heard. Pulmonary:      Effort: Pulmonary effort is normal. No respiratory distress. Breath sounds: Normal breath sounds. No stridor. No wheezing or rales. Chest:      Chest wall: No tenderness. Musculoskeletal:         General: No tenderness. Normal range of motion. Cervical back: Normal range of motion and neck supple. Skin:     General: Skin is warm and dry. Neurological:      Mental Status: She is alert and oriented to person, place, and time. Cranial Nerves: No cranial nerve deficit. Comments: No gross motor or sensory deficits    Psychiatric:         Behavior: Behavior normal.       Diagnostic Study Results     Labs -     Recent Results (from the past 12 hour(s))   STREP AG SCREEN, GROUP A    Collection Time: 11/29/22 12:36 PM    Specimen: Swab; Throat   Result Value Ref Range    Group A Strep Ag ID Negative NEG         Radiologic Studies -   No orders to display         Medical Decision Making   I am the first provider for this patient. I reviewed the vital signs, available nursing notes, past medical history, past surgical history, family history and social history. Vital Signs-Reviewed the patient's vital signs.   Patient Vitals for the past 12 hrs:   Temp Pulse Resp BP SpO2   11/29/22 1202 97.5 °F (36.4 °C) 62 16 137/77 99 %       Records Reviewed: Nursing Notes    Provider Notes (Medical Decision Making):   DDx-strep, viral pharyngitis, sinusitis    ED Course:   Initial assessment performed. The patients presenting problems have been discussed, and they are in agreement with the care plan formulated and outlined with them. I have encouraged them to ask questions as they arise throughout their visit. Patient strep is negative. Discussed with patient given the strep is negative would elect to treat with prednisone. Discussed with patient that we will print a prescription for antibiotics. Instructed the patient to hold the antibiotic for at least 48 to 72 hours if symptoms persist she can fill the antibiotic. Disposition:    DC- Adult Discharges: All of the diagnostic tests were reviewed and questions answered. Diagnosis, care plan and treatment options were discussed. The patient understands the instructions and will follow up as directed. The patients results have been reviewed with them. They have been counseled regarding their diagnosis. The patient verbally convey understanding and agreement of the signs, symptoms, diagnosis, treatment and prognosis and additionally agrees to follow up as recommended with their PCP in 24 - 48 hours. They also agree with the care-plan and convey that all of their questions have been answered. I have also put together some discharge instructions for them that include: 1) educational information regarding their diagnosis, 2) how to care for their diagnosis at home, as well a 3) list of reasons why they would want to return to the ED prior to their follow-up appointment, should their condition change. DISCHARGE PLAN:  1. Discharge Medication List as of 11/29/2022  2:04 PM        CONTINUE these medications which have CHANGED    Details   amoxicillin (AMOXIL) 875 mg tablet Take 1 Tablet by mouth two (2) times a day for 7 days. , Print, Disp-14 Tablet, R-0      predniSONE (DELTASONE) 20 mg tablet Take 3 Tablets by mouth daily for 5 days. , Normal, Disp-15 Tablet, R-0           CONTINUE these medications which have NOT CHANGED    Details   perphenazine (TRILAFON) 4 mg tablet Take 1 Tablet by mouth nightly., Normal, Disp-30 Tablet, R-5Jail patient      FLUoxetine (PROzac) 10 mg capsule Take 1 Capsule by mouth daily. , Normal, Disp-30 Capsule, R-5Jail patient      diphenhydrAMINE (Benadryl Allergy) 25 mg tablet Take 2 Tablets by mouth nightly., Normal, Disp-60 Tablet, R-5Jail patient      methocarbamoL (ROBAXIN) 500 mg tablet Take 1 Tablet by mouth three (3) times daily as needed for Muscle Spasm(s). , Normal, Disp-90 Tablet, R-5Jail patient      clindamycin (CLEOCIN) 300 mg capsule Take 1 Cap by mouth three (3) times daily. , Print, Disp-21 Cap, R-0      HYDROcodone-acetaminophen (NORCO) 5-325 mg per tablet Take 1 Tab by mouth every six (6) hours as needed for Pain. Max Daily Amount: 4 Tabs., Print, Disp-6 Tab, R-0      PRENATAL VIT CALC,IRON,FOLIC (PRENATAL VITAMIN PO) Take  by mouth., Historical Med      ondansetron (ZOFRAN ODT) 4 mg disintegrating tablet Take 1 Tab by mouth every eight (8) hours as needed for Nausea. , Print, Disp-20 Tab, R-0           2. Follow-up Information       Follow up With Specialties Details Why Contact Info    Cait Perry., MD [address] [address] [phone]            3. Return to ED if worse     Diagnosis     Clinical Impression:   1. Acute upper respiratory infection        Attestations:    Jenifer Jackson, DO        Please note that this dictation was completed with [employer], the [employer] voice recognition software. Quite often unanticipated grammatical, syntax, homophones, and other interpretive errors are inadvertently transcribed by the computer software. Please disregard these errors. Please excuse any errors that have escaped final proofreading. Thank you.

## 2022-11-29 NOTE — Clinical Note
[address] [place] [address] EMERGENCY DEP  [address] [facility]   [place] [id number]  [phone]    Work/School Note    Date: 11/29/2022    To Whom It May concern:      Steve Patel was seen and treated today in the emergency room by the following provider(s):  Attending Provider: Og Colon is excused from work/school on 11/29/22. She is clear to return to work/school on 11/30/22.         Sincerely,          Ahsan Robledo, DO

## 2022-12-01 LAB
BACTERIA SPEC CULT: NORMAL
SERVICE CMNT-IMP: NORMAL